# Patient Record
Sex: FEMALE | Race: WHITE | NOT HISPANIC OR LATINO | Employment: FULL TIME | URBAN - METROPOLITAN AREA
[De-identification: names, ages, dates, MRNs, and addresses within clinical notes are randomized per-mention and may not be internally consistent; named-entity substitution may affect disease eponyms.]

---

## 2019-07-25 ENCOUNTER — OFFICE VISIT (OUTPATIENT)
Dept: PRIMARY CARE CLINIC | Facility: CLINIC | Age: 39
End: 2019-07-25
Payer: COMMERCIAL

## 2019-07-25 VITALS
HEIGHT: 64 IN | WEIGHT: 182.81 LBS | TEMPERATURE: 98 F | SYSTOLIC BLOOD PRESSURE: 128 MMHG | DIASTOLIC BLOOD PRESSURE: 76 MMHG | RESPIRATION RATE: 16 BRPM | OXYGEN SATURATION: 98 % | BODY MASS INDEX: 31.21 KG/M2 | HEART RATE: 105 BPM

## 2019-07-25 DIAGNOSIS — S39.92XD INJURY OF COCCYX, SUBSEQUENT ENCOUNTER: ICD-10-CM

## 2019-07-25 DIAGNOSIS — M54.6 CHRONIC BILATERAL THORACIC BACK PAIN: ICD-10-CM

## 2019-07-25 DIAGNOSIS — J45.909 ASTHMA, UNSPECIFIED ASTHMA SEVERITY, UNSPECIFIED WHETHER COMPLICATED, UNSPECIFIED WHETHER PERSISTENT: ICD-10-CM

## 2019-07-25 DIAGNOSIS — E11.9 TYPE 2 DIABETES MELLITUS WITHOUT COMPLICATION, WITHOUT LONG-TERM CURRENT USE OF INSULIN: Primary | ICD-10-CM

## 2019-07-25 DIAGNOSIS — G89.29 CHRONIC BILATERAL THORACIC BACK PAIN: ICD-10-CM

## 2019-07-25 DIAGNOSIS — M05.79 RHEUMATOID ARTHRITIS INVOLVING MULTIPLE SITES WITH POSITIVE RHEUMATOID FACTOR: ICD-10-CM

## 2019-07-25 DIAGNOSIS — F32.A DEPRESSIVE DISORDER: ICD-10-CM

## 2019-07-25 PROBLEM — S39.92XA TAILBONE INJURY: Status: ACTIVE | Noted: 2019-07-25

## 2019-07-25 PROBLEM — M25.50 JOINT PAIN: Status: ACTIVE | Noted: 2019-07-25
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
## 2019-07-25 PROBLEM — F41.1 ANXIETY STATE: Status: ACTIVE | Noted: 2019-07-25

## 2019-07-25 PROCEDURE — 99214 PR OFFICE/OUTPT VISIT, EST, LEVL IV, 30-39 MIN: ICD-10-PCS | Mod: S$GLB,,, | Performed by: FAMILY MEDICINE

## 2019-07-25 PROCEDURE — 3008F BODY MASS INDEX DOCD: CPT | Mod: CPTII,S$GLB,, | Performed by: FAMILY MEDICINE

## 2019-07-25 PROCEDURE — 3008F PR BODY MASS INDEX (BMI) DOCUMENTED: ICD-10-PCS | Mod: CPTII,S$GLB,, | Performed by: FAMILY MEDICINE

## 2019-07-25 PROCEDURE — 99214 OFFICE O/P EST MOD 30 MIN: CPT | Mod: S$GLB,,, | Performed by: FAMILY MEDICINE

## 2019-07-25 RX ORDER — FLUOXETINE HYDROCHLORIDE 40 MG/1
CAPSULE ORAL
COMMUNITY
End: 2022-01-20

## 2019-07-25 RX ORDER — TRAZODONE HYDROCHLORIDE 100 MG/1
200 TABLET ORAL NIGHTLY
Refills: 5 | COMMUNITY
Start: 2019-06-28 | End: 2023-08-30 | Stop reason: SDUPTHER

## 2019-07-25 RX ORDER — PROPRANOLOL HYDROCHLORIDE 20 MG/1
20 TABLET ORAL 3 TIMES DAILY PRN
Refills: 5 | COMMUNITY
Start: 2019-06-29 | End: 2023-08-30

## 2019-07-25 RX ORDER — NALOXONE HYDROCHLORIDE 4 MG/.1ML
SPRAY NASAL
Refills: 0 | COMMUNITY
Start: 2019-05-24

## 2019-07-25 RX ORDER — HYDROCODONE BITARTRATE AND ACETAMINOPHEN 7.5; 325 MG/1; MG/1
1 TABLET ORAL 4 TIMES DAILY
COMMUNITY
End: 2022-01-20

## 2019-07-25 RX ORDER — ALBUTEROL SULFATE 90 UG/1
AEROSOL, METERED RESPIRATORY (INHALATION)
COMMUNITY
End: 2023-01-23 | Stop reason: SDUPTHER

## 2019-07-25 RX ORDER — CETIRIZINE HYDROCHLORIDE 10 MG/1
TABLET ORAL
COMMUNITY

## 2019-07-25 RX ORDER — CLONAZEPAM 1 MG/1
TABLET ORAL
COMMUNITY
End: 2022-01-20

## 2019-07-25 RX ORDER — GABAPENTIN 100 MG/1
CAPSULE ORAL
COMMUNITY
End: 2022-01-20

## 2019-07-25 RX ORDER — METFORMIN HYDROCHLORIDE 1000 MG/1
TABLET ORAL
Qty: 180 TABLET | Refills: 3 | Status: SHIPPED | OUTPATIENT
Start: 2019-07-25 | End: 2020-08-13

## 2019-07-25 RX ORDER — METHOTREXATE 2.5 MG/1
20 TABLET ORAL WEEKLY
Refills: 2 | COMMUNITY
Start: 2019-07-12 | End: 2020-04-03 | Stop reason: SDUPTHER

## 2019-07-25 RX ORDER — ADALIMUMAB 40MG/0.8ML
40 KIT SUBCUTANEOUS
COMMUNITY
End: 2022-01-20

## 2019-07-25 RX ORDER — FOLIC ACID 1 MG/1
TABLET ORAL
COMMUNITY

## 2019-07-25 RX ORDER — METFORMIN HYDROCHLORIDE 1000 MG/1
TABLET ORAL
COMMUNITY
End: 2019-07-25 | Stop reason: SDUPTHER

## 2019-07-25 RX ORDER — CYCLOBENZAPRINE HCL 10 MG
10 TABLET ORAL 3 TIMES DAILY
Refills: 2 | COMMUNITY
Start: 2019-06-29

## 2019-07-25 NOTE — PROGRESS NOTES
"Subjective:       Patient ID: Julianna Hancock is a 39 y.o. female.    Chief Complaint: Follow-up    Patient with chronic RA presents for follow-up on her chronic conditions.  She has history of diabetes which has been well controlled on medications without side effects.  Also with history of depression and anxiety.  This has been up and down.  She states she is doing okay right now, except that her son is leaving for the [place] in a month.  This makes her anxious, obviously.  She denies any side effects from the medications.  Also with slip off her steps and fx'ed tailbone, t 5 and t 6.  No surgery done.  Seeing dr mccloud.  Pain is better, but is persistent.  Her RA worsened after this.  She sees dr edge for this.      Review of Systems   Constitutional: Negative for chills, fatigue, fever and unexpected weight change.   Eyes: Negative for visual disturbance.   Respiratory: Negative for cough, shortness of breath and wheezing.    Cardiovascular: Negative for chest pain and palpitations.   Gastrointestinal: Negative for abdominal pain and blood in stool.   Genitourinary: Negative for difficulty urinating and dysuria.   Musculoskeletal: Positive for arthralgias and back pain.   Skin: Negative for rash.   Neurological: Negative for dizziness, syncope, light-headedness, numbness and headaches.   Hematological: Negative for adenopathy.   Psychiatric/Behavioral: Negative for dysphoric mood. The patient is not nervous/anxious.            Objective:      /76 (BP Location: Right arm, Patient Position: Sitting, BP Method: Large (Manual))   Pulse 105   Temp 97.8 °F (36.6 °C) (Oral)   Resp 16   Ht 5' 4" (1.626 m)   Wt 82.9 kg (182 lb 12.8 oz)   SpO2 98%   BMI 31.38 kg/m²   Estimated body mass index is 31.38 kg/m² as calculated from the following:    Height as of this encounter: 5' 4" (1.626 m).    Weight as of this encounter: 82.9 kg (182 lb 12.8 oz).  Physical Exam   Constitutional: She is oriented to person, " place, and time. She appears well-developed and well-nourished.   HENT:   Head: Normocephalic and atraumatic.   Eyes: Conjunctivae and EOM are normal.   Neck: Neck supple. No thyromegaly present.   Cardiovascular: Normal rate, regular rhythm and intact distal pulses.   No murmur heard.  Pulmonary/Chest: Effort normal and breath sounds normal.   Abdominal: Soft. Bowel sounds are normal. She exhibits no mass. There is no tenderness. There is no rebound and no guarding.   Musculoskeletal: Normal range of motion.   Neurological: She is alert and oriented to person, place, and time.   Skin: Skin is dry. No rash noted.   Psychiatric: She has a normal mood and affect.       Assessment:       Problem List Items Addressed This Visit        Psychiatric    Depressive disorder       Pulmonary    Asthma       Endocrine    Diabetes mellitus - Primary    Relevant Medications    metFORMIN (GLUCOPHAGE) 1000 MG tablet       Orthopedic    Rheumatoid arthritis involving multiple sites with positive rheumatoid factor    Tailbone injury    Chronic bilateral thoracic back pain            Plan:       Type 2 diabetes mellitus without complication, without long-term current use of insulin  -     metFORMIN (GLUCOPHAGE) 1000 MG tablet; Take 1 tablet twice a day by oral route.  Dispense: 180 tablet; Refill: 3    Depressive disorder    Rheumatoid arthritis involving multiple sites with positive rheumatoid factor    Asthma, unspecified asthma severity, unspecified whether complicated, unspecified whether persistent    Injury of coccyx, subsequent encounter    Chronic bilateral thoracic back pain              DISCUSSION:  Labs are good.  Cont meds.  Relaxation tech's discussed.

## 2020-04-03 DIAGNOSIS — M05.79 RHEUMATOID ARTHRITIS INVOLVING MULTIPLE SITES WITH POSITIVE RHEUMATOID FACTOR: Primary | ICD-10-CM

## 2020-04-03 NOTE — TELEPHONE ENCOUNTER
----- Message from Oscar Robins sent at 4/3/2020  8:52 AM CDT -----  Contact: Pt  Julianna called saying she needs a refill of methotrexate 2.5mg tablets 8 tablets weekly because her rheumatologist Dr. Perera is out right now.    [id number] - [place], [place] - [address]  [address] [address]  Phone: [phone] Fax: [phone]

## 2020-04-03 NOTE — TELEPHONE ENCOUNTER
Their office has closed the doors for now due to the COVID Virus but they are still working. She said they informed her to call her PCP. I told her  will be out of the office until Monday. States she will wait for the refill. Her last apt with us was 07/2019. I am sending this to the front so she can get an apt schedule for 06/2020

## 2020-04-06 RX ORDER — METHOTREXATE 2.5 MG/1
20 TABLET ORAL WEEKLY
Qty: 32 TABLET | Refills: 5 | Status: SHIPPED | OUTPATIENT
Start: 2020-04-06 | End: 2020-05-06

## 2020-08-20 ENCOUNTER — PATIENT OUTREACH (OUTPATIENT)
Dept: ADMINISTRATIVE | Facility: HOSPITAL | Age: 40
End: 2020-08-20

## 2020-10-29 ENCOUNTER — OFFICE VISIT (OUTPATIENT)
Dept: PRIMARY CARE CLINIC | Facility: CLINIC | Age: 40
End: 2020-10-29
Payer: COMMERCIAL

## 2020-10-29 VITALS
HEART RATE: 76 BPM | HEIGHT: 64 IN | WEIGHT: 184 LBS | RESPIRATION RATE: 18 BRPM | OXYGEN SATURATION: 98 % | TEMPERATURE: 98 F | DIASTOLIC BLOOD PRESSURE: 74 MMHG | SYSTOLIC BLOOD PRESSURE: 126 MMHG | BODY MASS INDEX: 31.41 KG/M2

## 2020-10-29 DIAGNOSIS — M05.79 RHEUMATOID ARTHRITIS INVOLVING MULTIPLE SITES WITH POSITIVE RHEUMATOID FACTOR: ICD-10-CM

## 2020-10-29 DIAGNOSIS — J45.909 ASTHMA, UNSPECIFIED ASTHMA SEVERITY, UNSPECIFIED WHETHER COMPLICATED, UNSPECIFIED WHETHER PERSISTENT: ICD-10-CM

## 2020-10-29 DIAGNOSIS — F32.A DEPRESSIVE DISORDER: ICD-10-CM

## 2020-10-29 DIAGNOSIS — Z00.01 ANNUAL VISIT FOR GENERAL ADULT MEDICAL EXAMINATION WITH ABNORMAL FINDINGS: Primary | ICD-10-CM

## 2020-10-29 DIAGNOSIS — E11.9 TYPE 2 DIABETES MELLITUS WITHOUT COMPLICATION, WITHOUT LONG-TERM CURRENT USE OF INSULIN: ICD-10-CM

## 2020-10-29 PROCEDURE — 99396 PR PREVENTIVE VISIT,EST,40-64: ICD-10-PCS | Mod: S$GLB,,, | Performed by: FAMILY MEDICINE

## 2020-10-29 PROCEDURE — 3008F BODY MASS INDEX DOCD: CPT | Mod: CPTII,S$GLB,, | Performed by: FAMILY MEDICINE

## 2020-10-29 PROCEDURE — 3008F PR BODY MASS INDEX (BMI) DOCUMENTED: ICD-10-PCS | Mod: CPTII,S$GLB,, | Performed by: FAMILY MEDICINE

## 2020-10-29 PROCEDURE — 99396 PREV VISIT EST AGE 40-64: CPT | Mod: S$GLB,,, | Performed by: FAMILY MEDICINE

## 2020-10-29 RX ORDER — METFORMIN HYDROCHLORIDE 1000 MG/1
TABLET ORAL
Qty: 60 TABLET | Refills: 11 | Status: SHIPPED | OUTPATIENT
Start: 2020-10-29 | End: 2022-01-20 | Stop reason: SDUPTHER
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
## 2021-03-11 ENCOUNTER — IMMUNIZATION (OUTPATIENT)
Dept: HEMATOLOGY/ONCOLOGY | Facility: CLINIC | Age: 41
End: 2021-03-11
Payer: COMMERCIAL

## 2021-03-11 DIAGNOSIS — Z23 NEED FOR VACCINATION: Primary | ICD-10-CM

## 2021-03-11 PROCEDURE — 91301 COVID-19, MRNA, LNP-S, PF, 100 MCG/0.5 ML DOSE VACCINE: ICD-10-PCS | Mod: S$GLB,,, | Performed by: FAMILY MEDICINE

## 2021-03-11 PROCEDURE — 91301 COVID-19, MRNA, LNP-S, PF, 100 MCG/0.5 ML DOSE VACCINE: CPT | Mod: S$GLB,,, | Performed by: FAMILY MEDICINE

## 2021-03-11 PROCEDURE — 0011A COVID-19, MRNA, LNP-S, PF, 100 MCG/0.5 ML DOSE VACCINE: CPT | Mod: CV19,S$GLB,, | Performed by: FAMILY MEDICINE

## 2021-03-11 PROCEDURE — 0011A COVID-19, MRNA, LNP-S, PF, 100 MCG/0.5 ML DOSE VACCINE: ICD-10-PCS | Mod: CV19,S$GLB,, | Performed by: FAMILY MEDICINE

## 2021-04-08 ENCOUNTER — IMMUNIZATION (OUTPATIENT)
Dept: HEMATOLOGY/ONCOLOGY | Facility: CLINIC | Age: 41
End: 2021-04-08
Payer: COMMERCIAL

## 2021-04-08 DIAGNOSIS — Z23 NEED FOR VACCINATION: Primary | ICD-10-CM

## 2021-04-08 PROCEDURE — 0012A COVID-19, MRNA, LNP-S, PF, 100 MCG/0.5 ML DOSE VACCINE: ICD-10-PCS | Mod: CV19,S$GLB,, | Performed by: FAMILY MEDICINE

## 2021-04-08 PROCEDURE — 91301 COVID-19, MRNA, LNP-S, PF, 100 MCG/0.5 ML DOSE VACCINE: CPT | Mod: S$GLB,,, | Performed by: FAMILY MEDICINE

## 2021-04-08 PROCEDURE — 91301 COVID-19, MRNA, LNP-S, PF, 100 MCG/0.5 ML DOSE VACCINE: ICD-10-PCS | Mod: S$GLB,,, | Performed by: FAMILY MEDICINE

## 2021-04-08 PROCEDURE — 0012A COVID-19, MRNA, LNP-S, PF, 100 MCG/0.5 ML DOSE VACCINE: CPT | Mod: CV19,S$GLB,, | Performed by: FAMILY MEDICINE

## 2021-04-26 ENCOUNTER — PATIENT OUTREACH (OUTPATIENT)
Dept: ADMINISTRATIVE | Facility: HOSPITAL | Age: 41
End: 2021-04-26

## 2022-01-06 ENCOUNTER — IMMUNIZATION (OUTPATIENT)
Dept: HEMATOLOGY/ONCOLOGY | Facility: CLINIC | Age: 42
End: 2022-01-06
Payer: COMMERCIAL

## 2022-01-06 DIAGNOSIS — Z23 NEED FOR VACCINATION: Primary | ICD-10-CM

## 2022-01-06 PROCEDURE — 0064A COVID-19, MRNA, LNP-S, PF, 100 MCG/0.25 ML DOSE VACCINE (MODERNA BOOSTER): ICD-10-PCS | Mod: S$GLB,,, | Performed by: FAMILY MEDICINE

## 2022-01-06 PROCEDURE — 91306 COVID-19, MRNA, LNP-S, PF, 100 MCG/0.25 ML DOSE VACCINE (MODERNA BOOSTER): ICD-10-PCS | Mod: S$GLB,,, | Performed by: FAMILY MEDICINE

## 2022-01-06 PROCEDURE — 0064A COVID-19, MRNA, LNP-S, PF, 100 MCG/0.25 ML DOSE VACCINE (MODERNA BOOSTER): CPT | Mod: S$GLB,,, | Performed by: FAMILY MEDICINE

## 2022-01-06 PROCEDURE — 91306 COVID-19, MRNA, LNP-S, PF, 100 MCG/0.25 ML DOSE VACCINE (MODERNA BOOSTER): CPT | Mod: S$GLB,,, | Performed by: FAMILY MEDICINE

## 2022-01-20 ENCOUNTER — PATIENT MESSAGE (OUTPATIENT)
Dept: ADMINISTRATIVE | Facility: HOSPITAL | Age: 42
End: 2022-01-20
Payer: COMMERCIAL

## 2022-01-20 ENCOUNTER — OFFICE VISIT (OUTPATIENT)
Dept: PRIMARY CARE CLINIC | Facility: CLINIC | Age: 42
End: 2022-01-20
Payer: COMMERCIAL

## 2022-01-20 ENCOUNTER — CLINICAL SUPPORT (OUTPATIENT)
Dept: HEMATOLOGY/ONCOLOGY | Facility: CLINIC | Age: 42
End: 2022-01-20
Payer: COMMERCIAL

## 2022-01-20 VITALS
HEIGHT: 64 IN | DIASTOLIC BLOOD PRESSURE: 88 MMHG | BODY MASS INDEX: 30.11 KG/M2 | OXYGEN SATURATION: 100 % | SYSTOLIC BLOOD PRESSURE: 128 MMHG | WEIGHT: 176.38 LBS | HEART RATE: 87 BPM | TEMPERATURE: 98 F

## 2022-01-20 DIAGNOSIS — E11.9 TYPE 2 DIABETES MELLITUS WITHOUT COMPLICATION, WITHOUT LONG-TERM CURRENT USE OF INSULIN: ICD-10-CM

## 2022-01-20 DIAGNOSIS — J45.909 ASTHMA, UNSPECIFIED ASTHMA SEVERITY, UNSPECIFIED WHETHER COMPLICATED, UNSPECIFIED WHETHER PERSISTENT: Primary | ICD-10-CM

## 2022-01-20 DIAGNOSIS — R09.81 NASAL CONGESTION: Primary | ICD-10-CM

## 2022-01-20 DIAGNOSIS — M05.79 RHEUMATOID ARTHRITIS INVOLVING MULTIPLE SITES WITH POSITIVE RHEUMATOID FACTOR: ICD-10-CM

## 2022-01-20 DIAGNOSIS — U07.1 COVID-19 VIRUS DETECTED: ICD-10-CM

## 2022-01-20 DIAGNOSIS — Z00.01 ANNUAL VISIT FOR GENERAL ADULT MEDICAL EXAMINATION WITH ABNORMAL FINDINGS: ICD-10-CM

## 2022-01-20 DIAGNOSIS — F32.A DEPRESSIVE DISORDER: ICD-10-CM

## 2022-01-20 LAB
CTP QC/QA: YES
SARS-COV-2 AG RESP QL IA.RAPID: POSITIVE

## 2022-01-20 PROCEDURE — 3008F PR BODY MASS INDEX (BMI) DOCUMENTED: ICD-10-PCS | Mod: CPTII,S$GLB,, | Performed by: FAMILY MEDICINE

## 2022-01-20 PROCEDURE — 3079F DIAST BP 80-89 MM HG: CPT | Mod: CPTII,S$GLB,, | Performed by: FAMILY MEDICINE

## 2022-01-20 PROCEDURE — 1160F PR REVIEW ALL MEDS BY PRESCRIBER/CLIN PHARMACIST DOCUMENTED: ICD-10-PCS | Mod: CPTII,S$GLB,, | Performed by: FAMILY MEDICINE

## 2022-01-20 PROCEDURE — 1160F RVW MEDS BY RX/DR IN RCRD: CPT | Mod: CPTII,S$GLB,, | Performed by: FAMILY MEDICINE

## 2022-01-20 PROCEDURE — 3079F PR MOST RECENT DIASTOLIC BLOOD PRESSURE 80-89 MM HG: ICD-10-PCS | Mod: CPTII,S$GLB,, | Performed by: FAMILY MEDICINE

## 2022-01-20 PROCEDURE — 1159F PR MEDICATION LIST DOCUMENTED IN MEDICAL RECORD: ICD-10-PCS | Mod: CPTII,S$GLB,, | Performed by: FAMILY MEDICINE

## 2022-01-20 PROCEDURE — 3074F PR MOST RECENT SYSTOLIC BLOOD PRESSURE < 130 MM HG: ICD-10-PCS | Mod: CPTII,S$GLB,, | Performed by: FAMILY MEDICINE

## 2022-01-20 PROCEDURE — 87811 SARS-COV-2 COVID19 W/OPTIC: CPT | Mod: S$GLB,,, | Performed by: INTERNAL MEDICINE

## 2022-01-20 PROCEDURE — 1159F MED LIST DOCD IN RCRD: CPT | Mod: CPTII,S$GLB,, | Performed by: FAMILY MEDICINE

## 2022-01-20 PROCEDURE — 3008F BODY MASS INDEX DOCD: CPT | Mod: CPTII,S$GLB,, | Performed by: FAMILY MEDICINE

## 2022-01-20 PROCEDURE — 99396 PREV VISIT EST AGE 40-64: CPT | Mod: S$GLB,,, | Performed by: FAMILY MEDICINE

## 2022-01-20 PROCEDURE — 3074F SYST BP LT 130 MM HG: CPT | Mod: CPTII,S$GLB,, | Performed by: FAMILY MEDICINE

## 2022-01-20 PROCEDURE — 99396 PR PREVENTIVE VISIT,EST,40-64: ICD-10-PCS | Mod: S$GLB,,, | Performed by: FAMILY MEDICINE

## 2022-01-20 PROCEDURE — 87811 SARS CORONAVIRUS 2 ANTIGEN POCT, MANUAL READ: ICD-10-PCS | Mod: S$GLB,,, | Performed by: INTERNAL MEDICINE

## 2022-01-20 RX ORDER — PSEUDOEPHEDRINE HCL 120 MG/1
TABLET, FILM COATED, EXTENDED RELEASE ORAL
COMMUNITY
Start: 2022-01-15 | End: 2023-08-30

## 2022-01-20 RX ORDER — CALCIUM CARBONATE 600 MG
600 TABLET ORAL ONCE
COMMUNITY

## 2022-01-20 RX ORDER — ACETAMINOPHEN 500 MG
1 TABLET ORAL DAILY
COMMUNITY

## 2022-01-20 RX ORDER — METFORMIN HYDROCHLORIDE 1000 MG/1
TABLET ORAL
Qty: 60 TABLET | Refills: 11 | Status: SHIPPED | OUTPATIENT
Start: 2022-01-20 | End: 2023-01-30

## 2022-01-20 RX ORDER — METHOTREXATE 2.5 MG/1
TABLET ORAL
COMMUNITY

## 2022-01-20 NOTE — PROGRESS NOTES
Subjective:       Patient ID: Julianna Hancock is a 41 y.o. female.    Chief Complaint: Follow-up    Patient presents for her annual wellness visit.  She has history of asthma.  This has been well controlled on medications without side effects.  She is only on albuterol as needed.  She really has to use it.  Also with history of diabetes.  She is on metformin 1000 mg b.i.d..  She denies any side effects and states it is worked well.  It is time to recheck this.  Also with history of depression.  She takes multiple medications for this and states it has been fairly well controlled.  She has been having much pain with the weather changes.  She has history of rheumatoid arthritis and sees Rheumatology monthly for this.  She is on the Orencia.        Review of Systems   Constitutional: Negative for chills, fatigue, fever and unexpected weight change.   Eyes: Negative for visual disturbance.   Respiratory: Negative for cough, shortness of breath and wheezing.    Cardiovascular: Negative for chest pain and palpitations.   Gastrointestinal: Negative for abdominal pain and blood in stool.   Genitourinary: Negative for difficulty urinating and dysuria.   Musculoskeletal: Positive for arthralgias and myalgias. Negative for back pain.   Skin: Negative for rash.   Neurological: Negative for dizziness, syncope, light-headedness, numbness and headaches.   Hematological: Negative for adenopathy.   Psychiatric/Behavioral: Negative for dysphoric mood. The patient is not nervous/anxious.      Medication List with Changes/Refills   Current Medications    ABATACEPT/MALTOSE (ORENCIA, WITH MALTOSE, IV)        ADALIMUMAB (HUMIRA) 40 MG/0.8 ML SYKT    Inject 40 mg into the skin every 14 (fourteen) days.    ALBUTEROL (PROVENTIL/VENTOLIN HFA) 90 MCG/ACTUATION INHALER    ProAir HFA 90 mcg/actuation aerosol inhaler   Inhale 2 puffs every 4 hours by inhalation route.    ASPIRIN-ACETAMINOPHEN-CAFFEINE 250-250-65 MG (EXCEDRIN MIGRAINE) 250-250-65 MG  "PER TABLET    Excedrin Migraine   2 tablets bid    CALCIUM CARBONATE (OS-ASHLEE) 600 MG CALCIUM (1,500 MG) TAB    Take 600 mg by mouth once.    CETIRIZINE (ZYRTEC) 10 MG TABLET    Zyrtec 10 mg tablet   Take 1 tablet every day by oral route.    CHOLECALCIFEROL, VITAMIN D3, (VITAMIN D3) 50 MCG (2,000 UNIT) CAP    Take 1 capsule by mouth once daily.    CLONAZEPAM (KLONOPIN) 1 MG TABLET    clonazepam 1 mg tablet    CYCLOBENZAPRINE (FLEXERIL) 10 MG TABLET    Take 10 mg by mouth 3 (three) times daily.    FLUOXETINE 40 MG CAPSULE    fluoxetine 40 mg capsule    FOLIC ACID (FOLVITE) 1 MG TABLET    folic acid 1 mg tablet    GABAPENTIN (NEURONTIN) 100 MG CAPSULE    gabapentin 100 mg capsule    HYDROCODONE-ACETAMINOPHEN (NORCO) 7.5-325 MG PER TABLET    Take 1 tablet by mouth 4 (four) times daily.    METHOTREXATE 2.5 MG TAB    Take 8 tablets (20 mg total) by mouth once a week.    METHOTREXATE 2.5 MG TAB    methotrexate sodium 2.5 mg tablet    NARCAN 4 MG/ACTUATION SPRY    USE WHEN NECESSARY FOR OVERDOSE. MAY REPEAT EVERY 3 MINUTES. AFTER FIRST DOSE CALL 911.    PROPRANOLOL (INDERAL) 20 MG TABLET    Take 20 mg by mouth 3 (three) times daily as needed.    PSEUDOEPHEDRINE (SUDAFED) 120 MG 12 HR TABLET        TRAZODONE (DESYREL) 100 MG TABLET    Take 200 mg by mouth nightly.   Changed and/or Refilled Medications    Modified Medication Previous Medication    METFORMIN (GLUCOPHAGE) 1000 MG TABLET metFORMIN (GLUCOPHAGE) 1000 MG tablet       Take 1 tablet by mouth twice daily    Take 1 tablet by mouth twice daily         Objective:      /88   Pulse 87   Temp 98.2 °F (36.8 °C)   Ht 5' 4" (1.626 m)   Wt 80 kg (176 lb 6.4 oz)   SpO2 100%   BMI 30.28 kg/m²   Estimated body mass index is 30.28 kg/m² as calculated from the following:    Height as of this encounter: 5' 4" (1.626 m).    Weight as of this encounter: 80 kg (176 lb 6.4 oz).  Physical Exam  Vitals reviewed.   Constitutional:       General: She is not in acute distress.     " Appearance: Normal appearance. She is well-developed and well-nourished. She is obese. She is not ill-appearing.   HENT:      Head: Normocephalic and atraumatic.   Eyes:      Extraocular Movements: EOM normal.      Conjunctiva/sclera: Conjunctivae normal.   Neck:      Thyroid: No thyromegaly.   Cardiovascular:      Rate and Rhythm: Normal rate and regular rhythm.      Pulses: Intact distal pulses.      Heart sounds: No murmur heard.      Pulmonary:      Effort: Pulmonary effort is normal.      Breath sounds: Normal breath sounds.   Abdominal:      General: Bowel sounds are normal.      Palpations: Abdomen is soft. There is no mass.      Tenderness: There is no abdominal tenderness. There is no guarding or rebound.   Musculoskeletal:         General: Normal range of motion.      Cervical back: Neck supple.   Skin:     General: Skin is dry.      Findings: No rash.   Neurological:      General: No focal deficit present.      Mental Status: She is alert and oriented to person, place, and time.   Psychiatric:         Mood and Affect: Mood and affect and mood normal.         Behavior: Behavior normal.         Thought Content: Thought content normal.         Judgment: Judgment normal.         Assessment:       Problem List Items Addressed This Visit        Psychiatric    Depressive disorder       Pulmonary    Asthma - Primary       Endocrine    Diabetes mellitus    Relevant Medications    metFORMIN (GLUCOPHAGE) 1000 MG tablet    Other Relevant Orders    Hemoglobin A1C    Lipid Panel       Orthopedic    Rheumatoid arthritis involving multiple sites with positive rheumatoid factor            Plan:       Asthma, unspecified asthma severity, unspecified whether complicated, unspecified whether persistent    Type 2 diabetes mellitus without complication, without long-term current use of insulin  -     metFORMIN (GLUCOPHAGE) 1000 MG tablet; Take 1 tablet by mouth twice daily  Dispense: 60 tablet; Refill: 11  -     Hemoglobin  A1C; Future; Expected date: 01/20/2022  -     Lipid Panel; Future; Expected date: 01/20/2022    Depressive disorder    Rheumatoid arthritis involving multiple sites with positive rheumatoid factor              DISCUSSION:  Get labs from [facility] and add lipids and a1c and tsh.  Cont f/u with rheum.  And exercise.

## 2022-04-21 ENCOUNTER — PATIENT MESSAGE (OUTPATIENT)
Dept: ADMINISTRATIVE | Facility: HOSPITAL | Age: 42
End: 2022-04-21
Payer: COMMERCIAL

## 2022-04-22 DIAGNOSIS — E11.9 TYPE 2 DIABETES MELLITUS WITHOUT COMPLICATION, UNSPECIFIED WHETHER LONG TERM INSULIN USE: ICD-10-CM

## 2022-07-18 ENCOUNTER — PATIENT OUTREACH (OUTPATIENT)
Dept: ADMINISTRATIVE | Facility: HOSPITAL | Age: 42
End: 2022-07-18
Payer: COMMERCIAL

## 2022-07-18 NOTE — PROGRESS NOTES
Working A1C gap report. Pt had not been compliant with completing her labs. Home A1C meter ordered back in April however no results found at this time. TRC

## 2022-09-02 ENCOUNTER — PATIENT OUTREACH (OUTPATIENT)
Dept: ADMINISTRATIVE | Facility: HOSPITAL | Age: 42
End: 2022-09-02
Payer: COMMERCIAL

## 2023-01-23 ENCOUNTER — OFFICE VISIT (OUTPATIENT)
Dept: FAMILY MEDICINE | Facility: CLINIC | Age: 43
End: 2023-01-23
Payer: COMMERCIAL

## 2023-01-23 VITALS
HEART RATE: 89 BPM | BODY MASS INDEX: 27.66 KG/M2 | SYSTOLIC BLOOD PRESSURE: 124 MMHG | OXYGEN SATURATION: 97 % | HEIGHT: 64 IN | DIASTOLIC BLOOD PRESSURE: 89 MMHG | WEIGHT: 162 LBS | RESPIRATION RATE: 18 BRPM | TEMPERATURE: 98 F

## 2023-01-23 DIAGNOSIS — E11.65 TYPE 2 DIABETES MELLITUS WITH HYPERGLYCEMIA, WITHOUT LONG-TERM CURRENT USE OF INSULIN: Primary | Chronic | ICD-10-CM

## 2023-01-23 DIAGNOSIS — M05.79 RHEUMATOID ARTHRITIS INVOLVING MULTIPLE SITES WITH POSITIVE RHEUMATOID FACTOR: Chronic | ICD-10-CM

## 2023-01-23 DIAGNOSIS — Z11.59 ENCOUNTER FOR SCREENING FOR OTHER VIRAL DISEASES: ICD-10-CM

## 2023-01-23 DIAGNOSIS — J30.2 SEASONAL ALLERGIES: Chronic | ICD-10-CM

## 2023-01-23 DIAGNOSIS — Z12.31 BREAST CANCER SCREENING BY MAMMOGRAM: ICD-10-CM

## 2023-01-23 DIAGNOSIS — J45.909 MODERATE ASTHMA WITHOUT COMPLICATION, UNSPECIFIED WHETHER PERSISTENT: Chronic | ICD-10-CM

## 2023-01-23 DIAGNOSIS — M15.8 OTHER OSTEOARTHRITIS INVOLVING MULTIPLE JOINTS: Chronic | ICD-10-CM

## 2023-01-23 PROBLEM — F43.10 PTSD (POST-TRAUMATIC STRESS DISORDER): Status: ACTIVE | Noted: 2023-01-23

## 2023-01-23 PROBLEM — M15.9 DEGENERATIVE JOINT DISEASE INVOLVING MULTIPLE JOINTS: Chronic | Status: ACTIVE | Noted: 2023-01-23

## 2023-01-23 PROCEDURE — 3079F PR MOST RECENT DIASTOLIC BLOOD PRESSURE 80-89 MM HG: ICD-10-PCS | Mod: CPTII,S$GLB,, | Performed by: NURSE PRACTITIONER

## 2023-01-23 PROCEDURE — 3074F SYST BP LT 130 MM HG: CPT | Mod: CPTII,S$GLB,, | Performed by: NURSE PRACTITIONER

## 2023-01-23 PROCEDURE — 99204 PR OFFICE/OUTPT VISIT, NEW, LEVL IV, 45-59 MIN: ICD-10-PCS | Mod: S$GLB,,, | Performed by: NURSE PRACTITIONER

## 2023-01-23 PROCEDURE — 99204 OFFICE O/P NEW MOD 45 MIN: CPT | Mod: S$GLB,,, | Performed by: NURSE PRACTITIONER

## 2023-01-23 PROCEDURE — 3008F BODY MASS INDEX DOCD: CPT | Mod: CPTII,S$GLB,, | Performed by: NURSE PRACTITIONER

## 2023-01-23 PROCEDURE — 3074F PR MOST RECENT SYSTOLIC BLOOD PRESSURE < 130 MM HG: ICD-10-PCS | Mod: CPTII,S$GLB,, | Performed by: NURSE PRACTITIONER

## 2023-01-23 PROCEDURE — 3008F PR BODY MASS INDEX (BMI) DOCUMENTED: ICD-10-PCS | Mod: CPTII,S$GLB,, | Performed by: NURSE PRACTITIONER

## 2023-01-23 PROCEDURE — 1159F PR MEDICATION LIST DOCUMENTED IN MEDICAL RECORD: ICD-10-PCS | Mod: CPTII,S$GLB,, | Performed by: NURSE PRACTITIONER

## 2023-01-23 PROCEDURE — 1159F MED LIST DOCD IN RCRD: CPT | Mod: CPTII,S$GLB,, | Performed by: NURSE PRACTITIONER

## 2023-01-23 PROCEDURE — 1160F PR REVIEW ALL MEDS BY PRESCRIBER/CLIN PHARMACIST DOCUMENTED: ICD-10-PCS | Mod: CPTII,S$GLB,, | Performed by: NURSE PRACTITIONER

## 2023-01-23 PROCEDURE — 1160F RVW MEDS BY RX/DR IN RCRD: CPT | Mod: CPTII,S$GLB,, | Performed by: NURSE PRACTITIONER

## 2023-01-23 PROCEDURE — 3079F DIAST BP 80-89 MM HG: CPT | Mod: CPTII,S$GLB,, | Performed by: NURSE PRACTITIONER

## 2023-01-23 RX ORDER — HYDROCODONE BITARTRATE AND ACETAMINOPHEN 7.5; 325 MG/1; MG/1
TABLET ORAL
COMMUNITY
Start: 2022-12-28

## 2023-01-23 RX ORDER — ALBUTEROL SULFATE 90 UG/1
2 AEROSOL, METERED RESPIRATORY (INHALATION) EVERY 4 HOURS PRN
Qty: 18 G | Refills: 3 | Status: SHIPPED | OUTPATIENT
Start: 2023-01-23
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
## 2023-01-27 LAB
CHOLEST SERPL-MSCNC: 121 MG/DL
HBA1C MFR BLD: 6.1 % (ref 4.5–6.2)
HCV AB SERPL QL IA: NONREACTIVE
HDL/CHOLESTEROL RATIO: 3.2 RATIO
HDLC SERPL-MCNC: 38 MG/DL (ref 39–96)
LDLC SERPL CALC-MCNC: 65.2 MG/DL
T4 FREE SP9 P CHAL SERPL-SCNC: 1.12 NG/DL (ref 0.76–1.46)
TRIGL SERPL-MCNC: 89 MG/DL (ref 30–200)
TSH SERPL DL<=0.005 MIU/L-ACNC: 1.57 UIU/ML (ref 0.36–3.74)
VLDL CHOLESTEROL: 18 MG/DL (ref 0–40)

## 2023-01-28 LAB — HIV 1+2 AB+HIV1 P24 AG SERPL QL IA: NEGATIVE

## 2023-01-30 ENCOUNTER — PATIENT MESSAGE (OUTPATIENT)
Dept: FAMILY MEDICINE | Facility: CLINIC | Age: 43
End: 2023-01-30
Payer: COMMERCIAL

## 2023-01-30 DIAGNOSIS — E11.65 TYPE 2 DIABETES MELLITUS WITH HYPERGLYCEMIA, WITHOUT LONG-TERM CURRENT USE OF INSULIN: Primary | ICD-10-CM

## 2023-01-30 RX ORDER — ORAL SEMAGLUTIDE 3 MG/1
3 TABLET ORAL EVERY MORNING
Qty: 30 TABLET | Refills: 11 | Status: SHIPPED | OUTPATIENT
Start: 2023-01-30

## 2023-01-30 RX ORDER — METFORMIN HYDROCHLORIDE 500 MG/1
500 TABLET ORAL 2 TIMES DAILY WITH MEALS
Qty: 180 TABLET | Refills: 3 | Status: SHIPPED | OUTPATIENT
Start: 2023-01-30 | End: 2024-02-07

## 2023-02-03 ENCOUNTER — OFFICE VISIT (OUTPATIENT)
Dept: OBSTETRICS AND GYNECOLOGY | Facility: CLINIC | Age: 43
End: 2023-02-03
Payer: COMMERCIAL

## 2023-02-03 VITALS
SYSTOLIC BLOOD PRESSURE: 145 MMHG | BODY MASS INDEX: 27.98 KG/M2 | DIASTOLIC BLOOD PRESSURE: 93 MMHG | WEIGHT: 163 LBS | HEART RATE: 98 BPM

## 2023-02-03 DIAGNOSIS — Z01.419 ROUTINE GYNECOLOGICAL EXAMINATION: Primary | ICD-10-CM

## 2023-02-03 DIAGNOSIS — Z12.31 SCREENING MAMMOGRAM, ENCOUNTER FOR: ICD-10-CM

## 2023-02-03 PROCEDURE — 3008F PR BODY MASS INDEX (BMI) DOCUMENTED: ICD-10-PCS | Mod: CPTII,S$GLB,, | Performed by: OBSTETRICS & GYNECOLOGY

## 2023-02-03 PROCEDURE — 99386 PR PREVENTIVE VISIT,NEW,40-64: ICD-10-PCS | Mod: S$GLB,,, | Performed by: OBSTETRICS & GYNECOLOGY

## 2023-02-03 PROCEDURE — 3080F PR MOST RECENT DIASTOLIC BLOOD PRESSURE >= 90 MM HG: ICD-10-PCS | Mod: CPTII,S$GLB,, | Performed by: OBSTETRICS & GYNECOLOGY

## 2023-02-03 PROCEDURE — 3077F PR MOST RECENT SYSTOLIC BLOOD PRESSURE >= 140 MM HG: ICD-10-PCS | Mod: CPTII,S$GLB,, | Performed by: OBSTETRICS & GYNECOLOGY

## 2023-02-03 PROCEDURE — 3044F PR MOST RECENT HEMOGLOBIN A1C LEVEL <7.0%: ICD-10-PCS | Mod: CPTII,S$GLB,, | Performed by: OBSTETRICS & GYNECOLOGY

## 2023-02-03 PROCEDURE — 99386 PREV VISIT NEW AGE 40-64: CPT | Mod: S$GLB,,, | Performed by: OBSTETRICS & GYNECOLOGY

## 2023-02-03 PROCEDURE — 3044F HG A1C LEVEL LT 7.0%: CPT | Mod: CPTII,S$GLB,, | Performed by: OBSTETRICS & GYNECOLOGY

## 2023-02-03 PROCEDURE — 3008F BODY MASS INDEX DOCD: CPT | Mod: CPTII,S$GLB,, | Performed by: OBSTETRICS & GYNECOLOGY

## 2023-02-03 PROCEDURE — 3080F DIAST BP >= 90 MM HG: CPT | Mod: CPTII,S$GLB,, | Performed by: OBSTETRICS & GYNECOLOGY

## 2023-02-03 PROCEDURE — 3077F SYST BP >= 140 MM HG: CPT | Mod: CPTII,S$GLB,, | Performed by: OBSTETRICS & GYNECOLOGY

## 2023-02-03 NOTE — PROGRESS NOTES
Subjective:       Patient ID: Julianna Hancock is a 42 y.o. female.    Chief Complaint:  Well Woman      History of Present Illness  Gynecologic Exam  The patient's pertinent negatives include no genital itching, genital lesions, genital odor, genital rash, missed menses, pelvic pain, vaginal bleeding or vaginal discharge. She is not pregnant. Associated symptoms include back pain, headaches and nausea. Pertinent negatives include no abdominal pain, anorexia, chills, constipation, diarrhea, discolored urine, dysuria, fever, flank pain, frequency, hematuria, painful intercourse, rash, urgency or vomiting. There has been no bleeding. She has not been passing clots. She has not been passing tissue. The symptoms are aggravated by activity, eating and heavy lifting. She has tried acetaminophen, NSAIDs and oral narcotics for the symptoms. The treatment provided no relief. She is sexually active. No, her partner does not have an STD. She uses nothing for contraception. Her menstrual history has been irregular. Her past medical history is significant for menorrhagia. There is no history of an abdominal surgery, a  section, an ectopic pregnancy, endometriosis, a gynecological surgery, herpes simplex, metrorrhagia, miscarriage, ovarian cysts, perineal abscess, PID, an STD, a terminated pregnancy or vaginosis.   Has noted a lump in left axillae. Has been there since the summer. Has had a mammogram ordered.  H/o RA and has it in shoulders. Has had it for 16 years. S/p uterine ablation with Dr. Marion.    GYN & OB History  No LMP recorded.   Date of Last Pap: 2018    OB History   No obstetric history on file.       Review of Systems  Review of Systems   Constitutional:  Negative for chills and fever.   Gastrointestinal:  Positive for nausea. Negative for abdominal pain, anorexia, constipation, diarrhea and vomiting.   Genitourinary:  Positive for menorrhagia. Negative for dysuria, flank pain, frequency, hematuria,  missed menses, pelvic pain, urgency and vaginal discharge.   Musculoskeletal:  Positive for back pain.   Integumentary:  Negative for rash.   Neurological:  Positive for headaches.         Objective:    Physical Exam:   Constitutional: She is oriented to person, place, and time. Vital signs are normal. She appears well-developed and well-nourished. She is cooperative.      Neck: No thyroid mass and no thyromegaly present.    Cardiovascular:  Normal rate, regular rhythm and normal pulses.             Pulmonary/Chest: Effort normal. No respiratory distress. Chest wall is not dull to percussion. She exhibits no mass, no bony tenderness, no laceration, no crepitus, no edema, no deformity, no swelling and no retraction. Right breast exhibits no inverted nipple, no mass, no nipple discharge, no skin change, no tenderness, presence, no bleeding and no swelling. Left breast exhibits no inverted nipple, no mass, no nipple discharge, no skin change, no tenderness, presence, no bleeding and no swelling.   Small lymph node in left breast.            Abdominal: Soft. She exhibits no distension. There is no abdominal tenderness. No hernia.     Genitourinary:    Vagina, uterus and rectum normal.      Pelvic exam was performed with patient supine.   Labial bartholins normal.There is no rash, tenderness, lesion or injury on the right labia. There is no rash, tenderness, lesion or injury on the left labia. Cervix is normal. Right adnexum displays no mass, no tenderness and no fullness. Left adnexum displays no mass, no tenderness and no fullness. No  no vaginal discharge, rectocele, cystocele or unspecified prolapse of vaginal walls in the vagina.           Musculoskeletal: Moves all extremeties.       Neurological: She is alert and oriented to person, place, and time.    Skin: Skin is warm and dry. No rash noted.    Psychiatric: She has a normal mood and affect. Her speech is normal and behavior is normal. Judgment and thought  content normal.        Assessment:        1. Routine gynecological examination    2. Screening mammogram, encounter for                Plan:      Patient is seeing Dr. Perera for RA.   Will f/u mammogram.

## 2023-02-03 NOTE — PROGRESS NOTES
Answers submitted by the patient for this visit:  Gynecologic Exam Questionnaire  (Submitted on 2023)  Chief Complaint: Gynecologic exam  genital itching: No  genital lesions: No  genital odor: No  genital rash: No  missed menses: No  pelvic pain: No  vaginal bleeding: No  vaginal discharge: No  Pregnant now?: No  abdominal pain: No  anorexia: No  back pain: Yes  chills: No  constipation: No  diarrhea: No  discolored urine: No  dysuria: No  fever: No  flank pain: No  frequency: No  headaches: Yes  hematuria: No  nausea: Yes  painful intercourse: No  rash: No  urgency: No  vomiting: No  Vaginal bleeding: no bleeding  Passing clots?: No  Passing tissue?: No  Aggravated by: activity, eating, heavy lifting  treatments tried: acetaminophen, NSAIDs, oral narcotics  Improvement on treatment: no relief  Sexual activity: sexually active  Partner with STD symptoms: no  Birth control: nothing  Menstrual history: irregular  STD: No  abdominal surgery: No   section: No  Ectopic pregnancy: No  Endometriosis: No  herpes simplex: No  gynecological surgery: No  menorrhagia: Yes  metrorrhagia: No  miscarriage: No  ovarian cysts: No  perineal abscess: No  PID: No  terminated pregnancy: No  vaginosis: No

## 2023-02-13 LAB — Lab: NORMAL

## 2023-03-13 ENCOUNTER — TELEPHONE (OUTPATIENT)
Dept: FAMILY MEDICINE | Facility: CLINIC | Age: 43
End: 2023-03-13
Payer: COMMERCIAL

## 2023-03-13 NOTE — TELEPHONE ENCOUNTER
----- Message from Gladys Jeong LPN sent at 3/13/2023  3:21 PM CDT -----  Contact: Celestino([facility])    ----- Message -----  From: Antonina Jeter  Sent: 3/13/2023   3:20 PM CDT  To: [facility] Staff    Celestino called to consult with nurse or staff regarding the patient. She states she sent a fax to the office and wanted to know if this had been received. Celestino would like a call back and can be reached at [phone]. Thanks/

## 2023-03-14 ENCOUNTER — TELEPHONE (OUTPATIENT)
Dept: FAMILY MEDICINE | Facility: CLINIC | Age: 43
End: 2023-03-14
Payer: COMMERCIAL

## 2023-03-14 NOTE — TELEPHONE ENCOUNTER
----- Message from Lillie Jama sent at 3/14/2023 10:15 AM CDT -----  Contact: Celestino teran/[facility] program calling to fine out fax rcvd and they can be reached at [phone]. Fax number [phone].    Thanks,

## 2023-05-15 ENCOUNTER — PATIENT MESSAGE (OUTPATIENT)
Dept: FAMILY MEDICINE | Facility: CLINIC | Age: 43
End: 2023-05-15
Payer: COMMERCIAL

## 2023-08-30 ENCOUNTER — OFFICE VISIT (OUTPATIENT)
Dept: FAMILY MEDICINE | Facility: CLINIC | Age: 43
End: 2023-08-30
Payer: COMMERCIAL

## 2023-08-30 VITALS
HEIGHT: 64 IN | HEART RATE: 88 BPM | BODY MASS INDEX: 26.46 KG/M2 | OXYGEN SATURATION: 98 % | DIASTOLIC BLOOD PRESSURE: 83 MMHG | SYSTOLIC BLOOD PRESSURE: 129 MMHG | WEIGHT: 155 LBS | RESPIRATION RATE: 18 BRPM

## 2023-08-30 DIAGNOSIS — E11.65 TYPE 2 DIABETES MELLITUS WITH HYPERGLYCEMIA, WITHOUT LONG-TERM CURRENT USE OF INSULIN: Primary | Chronic | ICD-10-CM

## 2023-08-30 DIAGNOSIS — F41.9 ANXIETY: Chronic | ICD-10-CM

## 2023-08-30 DIAGNOSIS — G47.09 SECONDARY INSOMNIA: Chronic | ICD-10-CM

## 2023-08-30 DIAGNOSIS — E55.9 VITAMIN D DEFICIENCY: Chronic | ICD-10-CM

## 2023-08-30 LAB
25(OH)D3 SERPL-MCNC: 44 NG/ML
ESTIMATED AVG GLUCOSE: 104 MG/DL
HBA1C MFR BLD: 5.1 % (ref 4.2–6.3)

## 2023-08-30 PROCEDURE — 3044F PR MOST RECENT HEMOGLOBIN A1C LEVEL <7.0%: ICD-10-PCS | Mod: CPTII,S$GLB,, | Performed by: NURSE PRACTITIONER

## 2023-08-30 PROCEDURE — 3079F DIAST BP 80-89 MM HG: CPT | Mod: CPTII,S$GLB,, | Performed by: NURSE PRACTITIONER

## 2023-08-30 PROCEDURE — 1159F PR MEDICATION LIST DOCUMENTED IN MEDICAL RECORD: ICD-10-PCS | Mod: CPTII,S$GLB,, | Performed by: NURSE PRACTITIONER

## 2023-08-30 PROCEDURE — 3074F SYST BP LT 130 MM HG: CPT | Mod: CPTII,S$GLB,, | Performed by: NURSE PRACTITIONER

## 2023-08-30 PROCEDURE — 3008F BODY MASS INDEX DOCD: CPT | Mod: CPTII,S$GLB,, | Performed by: NURSE PRACTITIONER

## 2023-08-30 PROCEDURE — 1159F MED LIST DOCD IN RCRD: CPT | Mod: CPTII,S$GLB,, | Performed by: NURSE PRACTITIONER

## 2023-08-30 PROCEDURE — 3074F PR MOST RECENT SYSTOLIC BLOOD PRESSURE < 130 MM HG: ICD-10-PCS | Mod: CPTII,S$GLB,, | Performed by: NURSE PRACTITIONER

## 2023-08-30 PROCEDURE — 1160F RVW MEDS BY RX/DR IN RCRD: CPT | Mod: CPTII,S$GLB,, | Performed by: NURSE PRACTITIONER

## 2023-08-30 PROCEDURE — 3008F PR BODY MASS INDEX (BMI) DOCUMENTED: ICD-10-PCS | Mod: CPTII,S$GLB,, | Performed by: NURSE PRACTITIONER

## 2023-08-30 PROCEDURE — 1160F PR REVIEW ALL MEDS BY PRESCRIBER/CLIN PHARMACIST DOCUMENTED: ICD-10-PCS | Mod: CPTII,S$GLB,, | Performed by: NURSE PRACTITIONER

## 2023-08-30 PROCEDURE — 3044F HG A1C LEVEL LT 7.0%: CPT | Mod: CPTII,S$GLB,, | Performed by: NURSE PRACTITIONER

## 2023-08-30 PROCEDURE — 3079F PR MOST RECENT DIASTOLIC BLOOD PRESSURE 80-89 MM HG: ICD-10-PCS | Mod: CPTII,S$GLB,, | Performed by: NURSE PRACTITIONER

## 2023-08-30 PROCEDURE — 99214 OFFICE O/P EST MOD 30 MIN: CPT | Mod: S$GLB,,, | Performed by: NURSE PRACTITIONER

## 2023-08-30 PROCEDURE — 99214 PR OFFICE/OUTPT VISIT, EST, LEVL IV, 30-39 MIN: ICD-10-PCS | Mod: S$GLB,,, | Performed by: NURSE PRACTITIONER

## 2023-08-30 RX ORDER — PROPRANOLOL HYDROCHLORIDE 60 MG/1
60 CAPSULE, EXTENDED RELEASE ORAL DAILY
Qty: 90 CAPSULE | Refills: 1 | Status: SHIPPED | OUTPATIENT
Start: 2023-08-30 | End: 2024-03-04

## 2023-08-30 RX ORDER — GABAPENTIN 300 MG/1
2 CAPSULE ORAL NIGHTLY
COMMUNITY
Start: 2023-07-11

## 2023-08-30 RX ORDER — TRAZODONE HYDROCHLORIDE 100 MG/1
100 TABLET ORAL NIGHTLY
Qty: 90 TABLET | Refills: 1 | Status: SHIPPED | OUTPATIENT
Start: 2023-08-30 | End: 2024-03-04 | Stop reason: SDUPTHER
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
## 2023-09-01 ENCOUNTER — TELEPHONE (OUTPATIENT)
Dept: FAMILY MEDICINE | Facility: CLINIC | Age: 43
End: 2023-09-01
Payer: COMMERCIAL

## 2023-09-01 NOTE — TELEPHONE ENCOUNTER
----- Message from Rohini Vieira NP sent at 8/31/2023  7:48 AM CDT -----  Please let her know that her vitamin D levels are adequate on the OTC Vitamin D3 supplement; Her A1c is 5.1% which is very good...

## 2024-02-07 DIAGNOSIS — E11.65 TYPE 2 DIABETES MELLITUS WITH HYPERGLYCEMIA, WITHOUT LONG-TERM CURRENT USE OF INSULIN: ICD-10-CM

## 2024-02-07 RX ORDER — METFORMIN HYDROCHLORIDE 500 MG/1
500 TABLET ORAL 2 TIMES DAILY WITH MEALS
Qty: 180 TABLET | Refills: 1 | Status: SHIPPED | OUTPATIENT
Start: 2024-02-07

## 2024-02-16 ENCOUNTER — OFFICE VISIT (OUTPATIENT)
Dept: OBSTETRICS AND GYNECOLOGY | Facility: CLINIC | Age: 44
End: 2024-02-16
Payer: COMMERCIAL

## 2024-02-16 VITALS
SYSTOLIC BLOOD PRESSURE: 133 MMHG | HEIGHT: 64 IN | HEART RATE: 88 BPM | BODY MASS INDEX: 25.44 KG/M2 | DIASTOLIC BLOOD PRESSURE: 88 MMHG | WEIGHT: 149 LBS

## 2024-02-16 DIAGNOSIS — Z01.419 ROUTINE GYNECOLOGICAL EXAMINATION: Primary | ICD-10-CM

## 2024-02-16 DIAGNOSIS — Z12.31 SCREENING MAMMOGRAM, ENCOUNTER FOR: Primary | ICD-10-CM

## 2024-02-16 PROCEDURE — 3075F SYST BP GE 130 - 139MM HG: CPT | Mod: CPTII,S$GLB,, | Performed by: OBSTETRICS & GYNECOLOGY

## 2024-02-16 PROCEDURE — 99396 PREV VISIT EST AGE 40-64: CPT | Mod: S$GLB,,, | Performed by: OBSTETRICS & GYNECOLOGY

## 2024-02-16 PROCEDURE — 3008F BODY MASS INDEX DOCD: CPT | Mod: CPTII,S$GLB,, | Performed by: OBSTETRICS & GYNECOLOGY

## 2024-02-16 PROCEDURE — 3079F DIAST BP 80-89 MM HG: CPT | Mod: CPTII,S$GLB,, | Performed by: OBSTETRICS & GYNECOLOGY

## 2024-02-16 RX ORDER — CLOTRIMAZOLE AND BETAMETHASONE DIPROPIONATE 10; .64 MG/G; MG/G
CREAM TOPICAL 2 TIMES DAILY
Qty: 45 G | Refills: 1 | Status: SHIPPED | OUTPATIENT
Start: 2024-02-16 | End: 2025-02-15

## 2024-02-16 RX ORDER — FLUCONAZOLE 150 MG/1
150 TABLET ORAL WEEKLY
Qty: 4 TABLET | Refills: 1 | Status: SHIPPED | OUTPATIENT
Start: 2024-02-16 | End: 2025-02-15

## 2024-02-16 NOTE — PROGRESS NOTES
Subjective:      Patient ID: Julianna Hancock is a 44 y.o. female.    Chief Complaint:  Well Woman      History of Present Illness  HPI  Feeling some joint pain. Patient with RA.    GYN & OB History  No LMP recorded. Patient has had an ablation.   Date of Last Pap: 8/7/2018    OB History   No obstetric history on file.       Review of Systems  Review of Systems   Constitutional:  Negative for activity change, appetite change, fatigue, fever and unexpected weight change.   HENT:  Negative for nasal congestion and tinnitus.    Eyes:  Negative for visual disturbance.   Respiratory:  Negative for cough and shortness of breath.    Cardiovascular:  Negative for chest pain and leg swelling.   Gastrointestinal:  Negative for abdominal pain, bloating, blood in stool, constipation and diarrhea.   Genitourinary:  Negative for bladder incontinence, dysuria, vaginal bleeding, vaginal discharge, vaginal pain, vaginal dryness and vaginal odor.   Musculoskeletal:  Negative for arthralgias, back pain and joint swelling.   Integumentary:  Negative for acne.   Neurological:  Negative for headaches.   Psychiatric/Behavioral:  Negative for depression and sleep disturbance. The patient is not nervous/anxious.           Objective:     Physical Exam:   Constitutional: She is oriented to person, place, and time. Vital signs are normal. She appears well-developed and well-nourished. She is cooperative.      Neck: No thyroid mass and no thyromegaly present.    Cardiovascular:  Normal rate, regular rhythm and normal pulses.             Pulmonary/Chest: Effort normal. No respiratory distress. Chest wall is not dull to percussion. She exhibits no mass, no bony tenderness, no laceration, no crepitus, no edema, no deformity, no swelling and no retraction. Right breast exhibits no inverted nipple, no mass, no nipple discharge, no skin change, no tenderness, presence, no bleeding and no swelling. Left breast exhibits no inverted nipple, no mass, no  nipple discharge, no skin change, no tenderness, presence, no bleeding and no swelling.        Abdominal: Soft. She exhibits no distension. There is no abdominal tenderness. No hernia.     Genitourinary:    Vagina, uterus and rectum normal.      Pelvic exam was performed with patient supine.     Labial bartholins normal.There is no rash, tenderness, lesion or injury on the right labia. There is no rash, tenderness, lesion or injury on the left labia. Cervix is normal. Right adnexum displays no mass, no tenderness and no fullness. Left adnexum displays no mass, no tenderness and no fullness. No vaginal discharge, rectocele, cystocele or prolapse of vaginal walls in the vagina.           Musculoskeletal: Moves all extremeties.       Neurological: She is alert and oriented to person, place, and time.    Skin: Skin is warm and dry. No rash noted.    Psychiatric: She has a normal mood and affect. Her speech is normal and behavior is normal. Judgment and thought content normal.         Assessment:     No diagnosis found.   RA  Well Woman Exam        Plan:     Routine care

## 2024-02-26 LAB — Lab: NORMAL

## 2024-03-04 ENCOUNTER — OFFICE VISIT (OUTPATIENT)
Dept: FAMILY MEDICINE | Facility: CLINIC | Age: 44
End: 2024-03-04
Payer: COMMERCIAL

## 2024-03-04 VITALS
DIASTOLIC BLOOD PRESSURE: 88 MMHG | HEIGHT: 64 IN | SYSTOLIC BLOOD PRESSURE: 131 MMHG | WEIGHT: 150 LBS | HEART RATE: 80 BPM | BODY MASS INDEX: 25.61 KG/M2 | RESPIRATION RATE: 18 BRPM | OXYGEN SATURATION: 97 %

## 2024-03-04 DIAGNOSIS — E11.65 TYPE 2 DIABETES MELLITUS WITH HYPERGLYCEMIA, WITHOUT LONG-TERM CURRENT USE OF INSULIN: Primary | Chronic | ICD-10-CM

## 2024-03-04 DIAGNOSIS — F41.9 ANXIETY: Chronic | ICD-10-CM

## 2024-03-04 DIAGNOSIS — E55.9 VITAMIN D DEFICIENCY: Chronic | ICD-10-CM

## 2024-03-04 DIAGNOSIS — M05.79 RHEUMATOID ARTHRITIS INVOLVING MULTIPLE SITES WITH POSITIVE RHEUMATOID FACTOR: Chronic | ICD-10-CM

## 2024-03-04 DIAGNOSIS — G47.09 SECONDARY INSOMNIA: Chronic | ICD-10-CM

## 2024-03-04 DIAGNOSIS — M16.0 OSTEOARTHRITIS OF BOTH HIPS, UNSPECIFIED OSTEOARTHRITIS TYPE: Chronic | ICD-10-CM

## 2024-03-04 LAB
25(OH)D3 SERPL-MCNC: 49 NG/ML
CHOLEST SERPL-MSCNC: 113 MG/DL
ESTIMATED AVG GLUCOSE: 104 MG/DL
HBA1C MFR BLD: 5.1 % (ref 4.2–6.3)
HDLC SERPL-MCNC: 40 MG/DL
LDLC SERPL CALC-MCNC: 59.4 MG/DL
T4 FREE SP9 P CHAL SERPL-SCNC: 1.17 NG/DL (ref 0.76–1.46)
TRIGL SERPL-MCNC: 68 MG/DL (ref 0–149)
TSH SERPL DL<=0.005 MIU/L-ACNC: 0.98 UIU/ML (ref 0.36–3.74)
VLDL CHOLESTEROL: 14 MG/DL

## 2024-03-04 PROCEDURE — 1159F MED LIST DOCD IN RCRD: CPT | Mod: CPTII,S$GLB,, | Performed by: NURSE PRACTITIONER

## 2024-03-04 PROCEDURE — 3008F BODY MASS INDEX DOCD: CPT | Mod: CPTII,S$GLB,, | Performed by: NURSE PRACTITIONER

## 2024-03-04 PROCEDURE — 99214 OFFICE O/P EST MOD 30 MIN: CPT | Mod: S$GLB,,, | Performed by: NURSE PRACTITIONER

## 2024-03-04 PROCEDURE — 1160F RVW MEDS BY RX/DR IN RCRD: CPT | Mod: CPTII,S$GLB,, | Performed by: NURSE PRACTITIONER

## 2024-03-04 PROCEDURE — 3079F DIAST BP 80-89 MM HG: CPT | Mod: CPTII,S$GLB,, | Performed by: NURSE PRACTITIONER

## 2024-03-04 PROCEDURE — 3075F SYST BP GE 130 - 139MM HG: CPT | Mod: CPTII,S$GLB,, | Performed by: NURSE PRACTITIONER

## 2024-03-04 RX ORDER — PROPRANOLOL HYDROCHLORIDE 80 MG/1
80 CAPSULE, EXTENDED RELEASE ORAL DAILY
Qty: 90 CAPSULE | Refills: 1 | Status: SHIPPED | OUTPATIENT
Start: 2024-03-04

## 2024-03-04 RX ORDER — PROPRANOLOL HYDROCHLORIDE 60 MG/1
60 CAPSULE, EXTENDED RELEASE ORAL DAILY
Qty: 90 CAPSULE | Refills: 1 | Status: CANCELLED | OUTPATIENT
Start: 2024-03-04

## 2024-03-04 RX ORDER — TRAZODONE HYDROCHLORIDE 100 MG/1
100 TABLET ORAL NIGHTLY
Qty: 90 TABLET | Refills: 1 | Status: SHIPPED | OUTPATIENT
Start: 2024-03-04
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
## 2024-03-05 LAB
CREATININE, URINE: 43.1 MG/DL (ref 10–175)
MICROALBUMIN URINE: 4 MG/L (ref 0–20)
MICROALBUMIN/CREATININE RATIO: 9 MG/G (ref 0–30)

## 2024-05-22 ENCOUNTER — PATIENT MESSAGE (OUTPATIENT)
Dept: FAMILY MEDICINE | Facility: CLINIC | Age: 44
End: 2024-05-22
Payer: COMMERCIAL

## 2024-05-23 ENCOUNTER — OFFICE VISIT (OUTPATIENT)
Dept: FAMILY MEDICINE | Facility: CLINIC | Age: 44
End: 2024-05-23
Payer: COMMERCIAL

## 2024-05-23 VITALS
HEIGHT: 64 IN | DIASTOLIC BLOOD PRESSURE: 78 MMHG | BODY MASS INDEX: 25.78 KG/M2 | HEART RATE: 83 BPM | TEMPERATURE: 98 F | OXYGEN SATURATION: 96 % | SYSTOLIC BLOOD PRESSURE: 128 MMHG | WEIGHT: 151 LBS

## 2024-05-23 DIAGNOSIS — Z01.818 PREOP EXAMINATION: Primary | ICD-10-CM

## 2024-05-23 DIAGNOSIS — E11.9 TYPE 2 DIABETES MELLITUS WITHOUT COMPLICATION, WITHOUT LONG-TERM CURRENT USE OF INSULIN: Chronic | ICD-10-CM

## 2024-05-23 DIAGNOSIS — S42.001K CLOSED DISPLACED FRACTURE OF RIGHT CLAVICLE WITH NONUNION, UNSPECIFIED PART OF CLAVICLE, SUBSEQUENT ENCOUNTER: ICD-10-CM

## 2024-05-23 PROBLEM — M70.70 BURSITIS OF HIP: Status: ACTIVE | Noted: 2024-05-23

## 2024-05-23 PROBLEM — B00.1 HERPES LABIALIS: Status: ACTIVE | Noted: 2024-05-23

## 2024-05-23 PROBLEM — M19.90 OSTEOARTHRITIS: Status: ACTIVE | Noted: 2023-01-23

## 2024-05-23 PROBLEM — M06.9 RHEUMATOID ARTHRITIS: Status: ACTIVE | Noted: 2019-07-25

## 2024-05-23 PROBLEM — G47.09 SECONDARY INSOMNIA: Status: ACTIVE | Noted: 2024-04-02

## 2024-05-23 PROBLEM — K44.9 HIATAL HERNIA: Status: ACTIVE | Noted: 2024-05-23

## 2024-05-23 PROBLEM — J30.1 ALLERGIC RHINITIS DUE TO POLLEN: Status: ACTIVE | Noted: 2024-05-23

## 2024-05-23 PROBLEM — E55.9 VITAMIN D DEFICIENCY: Status: ACTIVE | Noted: 2024-04-02

## 2024-05-23 PROBLEM — F41.9 ANXIETY: Status: ACTIVE | Noted: 2019-07-25

## 2024-05-23 LAB
BASOPHILS # BLD AUTO: 0 X10E3/UL (ref 0–0.2)
BASOPHILS NFR BLD AUTO: 1 %
BUN SERPL-MCNC: 12 MG/DL (ref 6–24)
BUN/CREAT SERPL: 18 (ref 9–23)
CALCIUM SERPL-MCNC: 9.5 MG/DL (ref 8.7–10.2)
CHLORIDE SERPL-SCNC: 99 MMOL/L (ref 96–106)
CO2 SERPL-SCNC: 25 MMOL/L (ref 20–29)
CREAT SERPL-MCNC: 0.68 MG/DL (ref 0.57–1)
EOSINOPHIL # BLD AUTO: 0.1 X10E3/UL (ref 0–0.4)
EOSINOPHIL NFR BLD AUTO: 1 %
ERYTHROCYTE [DISTWIDTH] IN BLOOD BY AUTOMATED COUNT: 15.6 % (ref 11.7–15.4)
EST. GFR  (NO RACE VARIABLE): 109 ML/MIN/1.73
GLUCOSE SERPL-MCNC: 90 MG/DL (ref 70–99)
HCT VFR BLD AUTO: 38.6 % (ref 34–46.6)
HGB BLD-MCNC: 12.3 G/DL (ref 11.1–15.9)
IMM GRANULOCYTES NFR BLD AUTO: 1 %
LYMPHOCYTES # BLD AUTO: 2.2 X10E3/UL (ref 0.7–3.1)
LYMPHOCYTES NFR BLD AUTO: 26 %
MCH RBC QN AUTO: 29.4 PG (ref 26.6–33)
MCHC RBC AUTO-ENTMCNC: 31.9 G/DL (ref 31.5–35.7)
MCV RBC AUTO: 92 FL (ref 79–97)
MONOCYTES # BLD AUTO: 0.5 X10E3/UL (ref 0.1–0.9)
MONOCYTES NFR BLD AUTO: 5 %
NEUTROPHILS # BLD AUTO: 5.6 X10E3/UL (ref 1.4–7)
NEUTROPHILS NFR BLD AUTO: 66 %
PLATELET # BLD AUTO: 246 X10E3/UL (ref 150–450)
POTASSIUM SERPL-SCNC: 4.3 MMOL/L (ref 3.5–5.2)
RBC # BLD AUTO: 4.19 X10E6/UL (ref 3.77–5.28)
SODIUM SERPL-SCNC: 139 MMOL/L (ref 134–144)
WBC # BLD AUTO: 8.5 X10E3/UL (ref 3.4–10.8)

## 2024-05-23 PROCEDURE — 1160F RVW MEDS BY RX/DR IN RCRD: CPT | Mod: CPTII,,, | Performed by: NURSE PRACTITIONER

## 2024-05-23 PROCEDURE — 3008F BODY MASS INDEX DOCD: CPT | Mod: CPTII,,, | Performed by: NURSE PRACTITIONER

## 2024-05-23 PROCEDURE — 93000 ELECTROCARDIOGRAM COMPLETE: CPT | Mod: ,,, | Performed by: NURSE PRACTITIONER

## 2024-05-23 PROCEDURE — 3074F SYST BP LT 130 MM HG: CPT | Mod: CPTII,,, | Performed by: NURSE PRACTITIONER

## 2024-05-23 PROCEDURE — 99214 OFFICE O/P EST MOD 30 MIN: CPT | Mod: ,,, | Performed by: NURSE PRACTITIONER

## 2024-05-23 PROCEDURE — 3066F NEPHROPATHY DOC TX: CPT | Mod: CPTII,,, | Performed by: NURSE PRACTITIONER

## 2024-05-23 PROCEDURE — 3044F HG A1C LEVEL LT 7.0%: CPT | Mod: CPTII,,, | Performed by: NURSE PRACTITIONER

## 2024-05-23 PROCEDURE — 3061F NEG MICROALBUMINURIA REV: CPT | Mod: CPTII,,, | Performed by: NURSE PRACTITIONER

## 2024-05-23 PROCEDURE — 3078F DIAST BP <80 MM HG: CPT | Mod: CPTII,,, | Performed by: NURSE PRACTITIONER

## 2024-05-23 PROCEDURE — 1159F MED LIST DOCD IN RCRD: CPT | Mod: CPTII,,, | Performed by: NURSE PRACTITIONER

## 2024-05-23 NOTE — ASSESSMENT & PLAN NOTE
Diabetes labs:   Lab Results   Component Value Date    HGBA1C 5.1 03/04/2024     Consider addition of ACE/ARB and statin at next visit

## 2024-05-23 NOTE — ASSESSMENT & PLAN NOTE
Patient is an above average risk for an intermediate risk procedure  Obtain, CBC, BMP, EKG per orthopedic recommendations

## 2024-05-23 NOTE — PROGRESS NOTES
Patient ID: Julianna Hancock  : 1980    Chief Complaint: surgery clearance and Establish Care    Allergies: Patient has No Known Allergies.     History of Present Illness:  The patient is a 44 y.o. White female who presents to clinic for follow up on surgery clearance and Establish Care.  She experienced fall about 3 days ago while she was walking her dog.  X-ray in ER revealed displaced right clavicular fracture.  She is scheduled to undergo ORIF 24 with Dr. Mixon.     DM2 diagnosed > 10 years ago. Last A1c 5.1 in March on Rybelsus and metformin. She has lost over 100 lbs. With medications and diet.     She is on propranolol for tachycardia but denies any known cardiac issues. No chest pain, palpitations, or history of issues with anesthesia.     Anxiety and depression are well-controlled with trazodone taken nightly.    RA followed by [facility] monthly for refills on pain medications.  She takes cyclobenzaprine, gabapentin, and Norco as needed. She received left hip injection by Dr. Bella and is awaiting injection into right hip.       Social History:  reports that she has quit smoking. Her smoking use included cigarettes. She has never used smokeless tobacco. She reports that she does not currently use alcohol.    Past Medical History:  has a past medical history of Anxiety, Asthma, Depression, Diabetes, and Rheumatoid arthritis, unspecified.    Current Medications:  Current Outpatient Medications   Medication Instructions    abatacept/maltose (ORENCIA, WITH MALTOSE, IV) No dose, route, or frequency recorded.    albuterol (PROVENTIL/VENTOLIN HFA) 90 mcg/actuation inhaler 2 puffs, Inhalation, Every 4 hours PRN, Rescue    calcium carbonate (OS-ASHLEE) 600 mg, Oral, Once    cetirizine (ZYRTEC) 10 MG tablet Zyrtec 10 mg tablet   Take 1 tablet every day by oral route.    cholecalciferol, vitamin D3, (VITAMIN D3) 50 mcg (2,000 unit) Cap capsule 1 capsule, Oral, Daily    clotrimazole-betamethasone 1-0.05%  "(LOTRISONE) cream Topical (Top), 2 times daily    cyclobenzaprine (FLEXERIL) 10 mg, Oral, 3 times daily    fluconazole (DIFLUCAN) 150 mg, Oral, Weekly    gabapentin (NEURONTIN) 300 MG capsule 2 capsules, Oral, Nightly    metFORMIN (GLUCOPHAGE) 500 mg, Oral, 2 times daily with meals    methotrexate 2.5 MG Tab methotrexate sodium 2.5 mg tablet    NARCAN 4 mg/actuation Spry USE WHEN NECESSARY FOR OVERDOSE. MAY REPEAT EVERY 3 MINUTES. AFTER FIRST DOSE CALL 911.    propranoloL (INDERAL LA) 80 mg, Oral, Daily    RYBELSUS 3 mg, Oral, Every morning    traZODone (DESYREL) 100 mg, Oral, Nightly       Review of Systems   Constitutional:  Negative for activity change, appetite change, fatigue and fever.   HENT:  Negative for ear pain, hearing loss and trouble swallowing.    Eyes:  Negative for pain, redness and visual disturbance.   Respiratory:  Negative for cough, chest tightness and shortness of breath.    Cardiovascular:  Negative for chest pain, palpitations, leg swelling and claudication.   Gastrointestinal:  Negative for abdominal pain, blood in stool, change in bowel habit, constipation, diarrhea, nausea and vomiting.   Endocrine: Negative for cold intolerance, heat intolerance, polydipsia, polyphagia and polyuria.   Genitourinary:  Negative for dysuria, frequency and hematuria.   Musculoskeletal:  Positive for arthralgias and joint swelling. Negative for gait problem.   Integumentary:  Negative for rash, wound and mole/lesion.   Neurological:  Negative for dizziness, seizures, weakness, headaches and memory loss.   Hematological:  Negative for adenopathy. Does not bruise/bleed easily.   Psychiatric/Behavioral:  Negative for confusion, sleep disturbance and suicidal ideas. The patient is not nervous/anxious.         Visit Vitals  /78 (BP Location: Left arm, Patient Position: Sitting, BP Method: Medium (Manual))   Pulse 83   Temp 97.5 °F (36.4 °C) (Temporal)   Ht 5' 4.02" (1.626 m)   Wt 68.5 kg (151 lb)   SpO2 96% "   BMI 25.91 kg/m²       Physical Exam  Vitals reviewed.   Constitutional:       General: She is not in acute distress.     Appearance: Normal appearance. She is not ill-appearing.   HENT:      Right Ear: Tympanic membrane, ear canal and external ear normal.      Left Ear: Tympanic membrane, ear canal and external ear normal.      Mouth/Throat:      Mouth: Mucous membranes are moist.   Eyes:      General: No scleral icterus.     Extraocular Movements: Extraocular movements intact.      Conjunctiva/sclera: Conjunctivae normal.      Pupils: Pupils are equal, round, and reactive to light.   Cardiovascular:      Rate and Rhythm: Normal rate and regular rhythm.      Pulses: Normal pulses.      Heart sounds: Normal heart sounds.   Pulmonary:      Effort: Pulmonary effort is normal.      Breath sounds: Normal breath sounds.   Abdominal:      General: Bowel sounds are normal.      Palpations: Abdomen is soft.      Tenderness: There is no abdominal tenderness.   Musculoskeletal:      Cervical back: Neck supple.      Right lower leg: No edema.      Left lower leg: No edema.      Comments: Edema and ecchymosis near right clavicle   Lymphadenopathy:      Cervical: No cervical adenopathy.   Skin:     General: Skin is warm and dry.   Neurological:      Mental Status: She is alert and oriented to person, place, and time. Mental status is at baseline.   Psychiatric:         Mood and Affect: Mood normal.         Thought Content: Thought content normal.         Judgment: Judgment normal.      EKG: Normal sinus rhythm at 81 beats per minute      Assessment & Plan:  1. Preop examination  Assessment & Plan:  Patient is an above average risk for an intermediate risk procedure  Obtain, CBC, BMP, EKG per orthopedic recommendations    Orders:  -     Cancel: CBC Auto Differential; Future; Expected date: 05/23/2024  -     Cancel: Basic Metabolic Panel; Future; Expected date: 05/23/2024  -     POCT EKG 12-LEAD (Manually Resulted by Ordering  Provider)  -     CBC Auto Differential  -     Basic Metabolic Panel    2. Type 2 diabetes mellitus without complication, without long-term current use of insulin  Overview:  On Rybelsus and metformin    Assessment & Plan:  Diabetes labs:   Lab Results   Component Value Date    HGBA1C 5.1 03/04/2024     Consider addition of ACE/ARB and statin at next visit    Orders:  -     CBC Auto Differential; Future; Expected date: 08/23/2024  -     Comprehensive Metabolic Panel; Future; Expected date: 08/23/2024  -     Lipid Panel; Future; Expected date: 08/23/2024  -     TSH; Future; Expected date: 08/23/2024  -     Hemoglobin A1C; Future; Expected date: 08/23/2024  -     CBC Auto Differential; Future; Expected date: 08/23/2024  -     Comprehensive Metabolic Panel; Future; Expected date: 08/23/2024  -     Hemoglobin A1C; Future; Expected date: 08/23/2024  -     Lipid Panel; Future; Expected date: 08/23/2024  -     TSH; Future; Expected date: 08/23/2024    3. 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  [address]       Follow up in about 4 months (around 9/23/2024) for DM, labs to be done in . Call sooner if needed.    RAUL Donald    Lab Frequency Next Occurrence   Mammo Digital Screening Bilat Once 02/16/2024

## 2024-06-13 ENCOUNTER — CLINICAL SUPPORT (OUTPATIENT)
Dept: FAMILY MEDICINE | Facility: CLINIC | Age: 44
End: 2024-06-13
Payer: COMMERCIAL

## 2024-06-13 DIAGNOSIS — E11.9 TYPE 2 DIABETES MELLITUS WITHOUT COMPLICATION, WITHOUT LONG-TERM CURRENT USE OF INSULIN: Primary | Chronic | ICD-10-CM

## 2024-06-14 LAB
LEFT EYE DM RETINOPATHY: NEGATIVE
RIGHT EYE DM RETINOPATHY: NEGATIVE

## 2024-09-16 ENCOUNTER — TELEPHONE (OUTPATIENT)
Dept: FAMILY MEDICINE | Facility: CLINIC | Age: 44
End: 2024-09-16
Payer: COMMERCIAL

## 2024-09-16 NOTE — TELEPHONE ENCOUNTER
Please call pt and see where she had labs done, and request them. we do not have them in chart for appt. On Wednesday

## 2024-09-17 ENCOUNTER — TELEPHONE (OUTPATIENT)
Dept: FAMILY MEDICINE | Facility: CLINIC | Age: 44
End: 2024-09-17
Payer: COMMERCIAL

## 2024-09-18 ENCOUNTER — OFFICE VISIT (OUTPATIENT)
Dept: FAMILY MEDICINE | Facility: CLINIC | Age: 44
End: 2024-09-18
Payer: COMMERCIAL

## 2024-09-18 VITALS
HEART RATE: 84 BPM | HEIGHT: 64 IN | TEMPERATURE: 98 F | OXYGEN SATURATION: 98 % | WEIGHT: 142 LBS | BODY MASS INDEX: 24.24 KG/M2 | DIASTOLIC BLOOD PRESSURE: 84 MMHG | SYSTOLIC BLOOD PRESSURE: 122 MMHG

## 2024-09-18 DIAGNOSIS — G47.09 SECONDARY INSOMNIA: Chronic | ICD-10-CM

## 2024-09-18 DIAGNOSIS — F41.9 ANXIETY: Chronic | ICD-10-CM

## 2024-09-18 DIAGNOSIS — E11.9 TYPE 2 DIABETES MELLITUS WITHOUT COMPLICATION, WITHOUT LONG-TERM CURRENT USE OF INSULIN: Primary | Chronic | ICD-10-CM

## 2024-09-18 PROCEDURE — 3074F SYST BP LT 130 MM HG: CPT | Mod: CPTII,,, | Performed by: NURSE PRACTITIONER

## 2024-09-18 PROCEDURE — 2023F DILAT RTA XM W/O RTNOPTHY: CPT | Mod: CPTII,,, | Performed by: NURSE PRACTITIONER

## 2024-09-18 PROCEDURE — 3044F HG A1C LEVEL LT 7.0%: CPT | Mod: CPTII,,, | Performed by: NURSE PRACTITIONER

## 2024-09-18 PROCEDURE — 1160F RVW MEDS BY RX/DR IN RCRD: CPT | Mod: CPTII,,, | Performed by: NURSE PRACTITIONER

## 2024-09-18 PROCEDURE — 3061F NEG MICROALBUMINURIA REV: CPT | Mod: CPTII,,, | Performed by: NURSE PRACTITIONER

## 2024-09-18 PROCEDURE — 99214 OFFICE O/P EST MOD 30 MIN: CPT | Mod: ,,, | Performed by: NURSE PRACTITIONER

## 2024-09-18 PROCEDURE — 1159F MED LIST DOCD IN RCRD: CPT | Mod: CPTII,,, | Performed by: NURSE PRACTITIONER

## 2024-09-18 PROCEDURE — 3066F NEPHROPATHY DOC TX: CPT | Mod: CPTII,,, | Performed by: NURSE PRACTITIONER

## 2024-09-18 PROCEDURE — 3079F DIAST BP 80-89 MM HG: CPT | Mod: CPTII,,, | Performed by: NURSE PRACTITIONER

## 2024-09-18 PROCEDURE — 3008F BODY MASS INDEX DOCD: CPT | Mod: CPTII,,, | Performed by: NURSE PRACTITIONER

## 2024-09-18 RX ORDER — HYDROCODONE BITARTRATE AND ACETAMINOPHEN 5; 325 MG/1; MG/1
1 TABLET ORAL EVERY 6 HOURS PRN
COMMUNITY
Start: 2024-06-25

## 2024-09-18 RX ORDER — METFORMIN HYDROCHLORIDE 500 MG/1
500 TABLET ORAL 2 TIMES DAILY WITH MEALS
Qty: 60 TABLET | Refills: 11 | Status: SHIPPED | OUTPATIENT
Start: 2024-09-18 | End: 2025-09-18

## 2024-09-18 RX ORDER — PROPRANOLOL HYDROCHLORIDE 80 MG/1
80 CAPSULE, EXTENDED RELEASE ORAL DAILY
Qty: 30 CAPSULE | Refills: 11 | Status: SHIPPED | OUTPATIENT
Start: 2024-09-18 | End: 2025-09-18

## 2024-09-18 RX ORDER — TRAZODONE HYDROCHLORIDE 100 MG/1
200 TABLET ORAL NIGHTLY
Qty: 60 TABLET | Refills: 11 | Status: SHIPPED | OUTPATIENT
Start: 2024-09-18 | End: 2025-09-18

## 2024-09-18 NOTE — ASSESSMENT & PLAN NOTE
Hemoglobin A1c 5.1  Continue current medications   Obtain urine microalbumin before next visit. Consider addition of ACE/ARB  Follow ADA Diet. Avoid soda, simple sweets, and limit rice/pasta/breads/starches  Maintain healthy weight with goal BMI <30.  Exercise 5 times per week for 30 minutes per day  Stressed importance of daily foot exams  Educated on importance of annual dilated eye exam

## 2024-09-18 NOTE — PROGRESS NOTES
Patient ID: Julianna Hancock  : 1980    Chief Complaint: Diabetes    Allergies: Patient has No Known Allergies.     History of Present Illness:  The patient is a 44 y.o. White female who presents to clinic for follow up on Diabetes. She is requesting to increase trazodone for insomnia. She was on 200 mg in the past. She has been taking 2 of the 100 mg tablets and is sleeping much better.    She continues physical therapy and orthopedic evaluation for right shoulder pain.  She also has follow-up in the next few weeks with Rheumatology for rheumatoid arthritis.    Reports that her last colonoscopy was in  with Dr. Mckeon and a repeat colonoscopy is needed in .    Social History:  reports that she has quit smoking. Her smoking use included cigarettes. She started smoking about 30 years ago. She has a 18 pack-year smoking history. She has never used smokeless tobacco. She reports that she does not currently use alcohol. She reports that she does not currently use drugs.    Past Medical History:  has a past medical history of Anxiety, Asthma, Depression, Diabetes, and Rheumatoid arthritis, unspecified.    Current Medications:  Current Outpatient Medications   Medication Instructions    abatacept/maltose (ORENCIA, WITH MALTOSE, IV) No dose, route, or frequency recorded.    albuterol (PROVENTIL/VENTOLIN HFA) 90 mcg/actuation inhaler 2 puffs, Inhalation, Every 4 hours PRN, Rescue    calcium carbonate (OS-ASHLEE) 600 mg, Oral, Once    cetirizine (ZYRTEC) 10 MG tablet Zyrtec 10 mg tablet   Take 1 tablet every day by oral route.    cholecalciferol, vitamin D3, (VITAMIN D3) 50 mcg (2,000 unit) Cap capsule 1 capsule, Oral, Daily    clotrimazole-betamethasone 1-0.05% (LOTRISONE) cream Topical (Top), 2 times daily    cyclobenzaprine (FLEXERIL) 10 mg, Oral, 3 times daily    fluconazole (DIFLUCAN) 150 mg, Oral, Weekly    gabapentin (NEURONTIN) 300 MG capsule 2 capsules, Oral, Nightly    HYDROcodone-acetaminophen (NORCO)  "5-325 mg per tablet 1 tablet, Oral, Every 6 hours PRN    metFORMIN (GLUCOPHAGE) 500 mg, Oral, 2 times daily with meals    methotrexate 2.5 MG Tab methotrexate sodium 2.5 mg tablet    NARCAN 4 mg/actuation Spry USE WHEN NECESSARY FOR OVERDOSE. MAY REPEAT EVERY 3 MINUTES. AFTER FIRST DOSE CALL 911.    propranoloL (INDERAL LA) 80 mg, Oral, Daily    RYBELSUS 3 mg, Oral, Every morning    traZODone (DESYREL) 200 mg, Oral, Nightly       ROS: See HPI    Visit Vitals  /84   Pulse 84   Temp 97.5 °F (36.4 °C) (Temporal)   Ht 5' 4" (1.626 m)   Wt 64.4 kg (142 lb)   LMP 09/15/2024   SpO2 98%   BMI 24.37 kg/m²       Physical Exam  Vitals reviewed.   Constitutional:       Appearance: Normal appearance.   Cardiovascular:      Rate and Rhythm: Normal rate and regular rhythm.      Heart sounds: Normal heart sounds.   Pulmonary:      Effort: Pulmonary effort is normal.      Breath sounds: Normal breath sounds.   Skin:     General: Skin is warm and dry.   Neurological:      Mental Status: She is alert and oriented to person, place, and time. Mental status is at baseline.        Protective Sensation (w/ 10 gram monofilament):  Right: Intact  Left: Intact    Visual Inspection:  Normal -  Bilateral    Pedal Pulses:   Right: Present  Left: Present    Posterior Tibialis Pulses:   Right:Present  Left: Present     Assessment & Plan:  1. Type 2 diabetes mellitus without complication, without long-term current use of insulin  Overview:  On Rybelsus and metformin    Assessment & Plan:  Hemoglobin A1c 5.1  Continue current medications   Obtain urine microalbumin before next visit. Consider addition of ACE/ARB  Follow ADA Diet. Avoid soda, simple sweets, and limit rice/pasta/breads/starches  Maintain healthy weight with goal BMI <30.  Exercise 5 times per week for 30 minutes per day  Stressed importance of daily foot exams  Educated on importance of annual dilated eye exam       Orders:  -     metFORMIN (GLUCOPHAGE) 500 MG tablet; Take 1 " tablet (500 mg total) by mouth 2 (two) times daily with meals.  Dispense: 60 tablet; Refill: 11  -     CBC Auto Differential; Future; Expected date: 03/18/2025  -     Comprehensive Metabolic Panel; Future; Expected date: 03/18/2025  -     Lipid Panel; Future; Expected date: 03/18/2025  -     TSH; Future; Expected date: 03/18/2025  -     Hemoglobin A1C; Future; Expected date: 03/18/2025  -     Microalbumin/Creatinine Ratio, Urine; Future; Expected date: 03/18/2025    2. Anxiety  Overview:  On propranolol    Assessment & Plan:  No complaints today   Continue current medication    Orders:  -     traZODone (DESYREL) 100 MG tablet; Take 2 tablets (200 mg total) by mouth every evening.  Dispense: 60 tablet; Refill: 11  -     propranoloL (INDERAL LA) 80 MG 24 hr capsule; Take 1 capsule (80 mg total) by mouth once daily.  Dispense: 30 capsule; Refill: 11    3. Secondary insomnia  Overview:  On trazodone    Assessment & Plan:  Remains symptomatic   Increase trazodone to 200 mg nightly  Sleep hygiene should include: Regular sleep schedule, optimal sleep environment (dark room, lights out, no TV or Radio), relaxing pre-sleep ritual, avoid day time naps, no caffeine after noon, no excessive alcohol intake, no tobacco before bed time, and regular daily exercise.     Orders:  -     traZODone (DESYREL) 100 MG tablet; Take 2 tablets (200 mg total) by mouth every evening.  Dispense: 60 tablet; Refill: 11         Future Appointments   Date Time Provider Department Center   3/14/2025  8:00 AM Terrie Funk FNP [facility]       Follow up in about 6 months (around 3/18/2025) for Wellness, With Fasting Labs Prior (In ). Call sooner if needed.    RAUL Donald    Lab Frequency Next Occurrence   Mammo Digital Screening Bilat Once 02/16/2024   CBC Auto Differential Once 08/23/2024   Comprehensive Metabolic Panel Once 08/23/2024   Hemoglobin A1C Once 08/23/2024   Lipid Panel Once 08/23/2024   TSH Once 08/23/2024

## 2024-09-18 NOTE — ASSESSMENT & PLAN NOTE
Remains symptomatic   Increase trazodone to 200 mg nightly  Sleep hygiene should include: Regular sleep schedule, optimal sleep environment (dark room, lights out, no TV or Radio), relaxing pre-sleep ritual, avoid day time naps, no caffeine after noon, no excessive alcohol intake, no tobacco before bed time, and regular daily exercise.

## 2024-12-26 ENCOUNTER — TELEPHONE (OUTPATIENT)
Dept: FAMILY MEDICINE | Facility: CLINIC | Age: 44
End: 2024-12-26
Payer: COMMERCIAL

## 2025-03-06 ENCOUNTER — TELEPHONE (OUTPATIENT)
Dept: FAMILY MEDICINE | Facility: CLINIC | Age: 45
End: 2025-03-06
Payer: COMMERCIAL

## 2025-03-14 ENCOUNTER — OFFICE VISIT (OUTPATIENT)
Dept: FAMILY MEDICINE | Facility: CLINIC | Age: 45
End: 2025-03-14
Payer: COMMERCIAL

## 2025-03-14 VITALS
SYSTOLIC BLOOD PRESSURE: 122 MMHG | BODY MASS INDEX: 23.84 KG/M2 | HEART RATE: 95 BPM | HEIGHT: 64 IN | WEIGHT: 139.63 LBS | OXYGEN SATURATION: 100 % | TEMPERATURE: 97 F | DIASTOLIC BLOOD PRESSURE: 86 MMHG

## 2025-03-14 DIAGNOSIS — E11.9 TYPE 2 DIABETES MELLITUS WITHOUT COMPLICATION, WITHOUT LONG-TERM CURRENT USE OF INSULIN: Chronic | ICD-10-CM

## 2025-03-14 DIAGNOSIS — G47.09 SECONDARY INSOMNIA: ICD-10-CM

## 2025-03-14 DIAGNOSIS — M06.9 RHEUMATOID ARTHRITIS, INVOLVING UNSPECIFIED SITE, UNSPECIFIED WHETHER RHEUMATOID FACTOR PRESENT: ICD-10-CM

## 2025-03-14 DIAGNOSIS — Z12.31 SCREENING MAMMOGRAM FOR BREAST CANCER: ICD-10-CM

## 2025-03-14 DIAGNOSIS — J45.909 ASTHMA, UNSPECIFIED ASTHMA SEVERITY, UNSPECIFIED WHETHER COMPLICATED, UNSPECIFIED WHETHER PERSISTENT: ICD-10-CM

## 2025-03-14 DIAGNOSIS — Z00.00 WELLNESS EXAMINATION: Primary | ICD-10-CM

## 2025-03-14 RX ORDER — TRAMADOL HYDROCHLORIDE 50 MG/1
TABLET ORAL
COMMUNITY
Start: 2025-02-14

## 2025-03-14 RX ORDER — CYANOCOBALAMIN (VITAMIN B-12) 250 MCG
TABLET ORAL
COMMUNITY

## 2025-03-14 RX ORDER — HYDROGEN PEROXIDE 3 %
SOLUTION, NON-ORAL MISCELLANEOUS
COMMUNITY

## 2025-03-14 NOTE — PROGRESS NOTES
Patient ID: Julianna Hancock  : 1980    Chief Complaint: Annual Exam    Allergies: Patient has no known allergies.     History of Present Illness:  The patient is a 45 y.o. White female who presents to clinic for follow up on Annual Exam.  She is established with Dr. Perera for Rheumatology.  She continues to experience joint pain and decreased range of motion secondary to rheumatoid arthritis. She reports recent EGD with GI for chronic gastritis.      Social History:  reports that she quit smoking about 13 years ago. Her smoking use included cigarettes. She started smoking about 31 years ago. She has a 18 pack-year smoking history. She has never used smokeless tobacco. She reports that she does not currently use alcohol. She reports that she does not currently use drugs.    Past Medical History:  has a past medical history of Amenorrhea, Anxiety, Asthma, Depression, Diabetes, Rheumatoid arthritis, unspecified, and Wellness examination.    Current Medications:  Current Outpatient Medications   Medication Instructions    abatacept/maltose (ORENCIA, WITH MALTOSE, IV) No dose, route, or frequency recorded.    albuterol (PROVENTIL/VENTOLIN HFA) 90 mcg/actuation inhaler 2 puffs, Inhalation, Every 4 hours PRN, Rescue    calcium carbonate (OS-ASHLEE) 600 mg, Once    cetirizine (ZYRTEC) 10 MG tablet Zyrtec 10 mg tablet   Take 1 tablet every day by oral route.    cholecalciferol, vitamin D3, (VITAMIN D3) 50 mcg (2,000 unit) Cap capsule 1 capsule, Daily    cyanocobalamin (VITAMIN B-12) 250 MCG tablet 1 tab(s) orally once a day    cyclobenzaprine (FLEXERIL) 10 mg, 3 times daily    esomeprazole (NEXIUM) 20 MG capsule 1 cap(s) orally once a day    gabapentin (NEURONTIN) 300 MG capsule 2 capsules, Nightly    metFORMIN (GLUCOPHAGE) 500 mg, Oral, 2 times daily with meals    methotrexate 2.5 MG Tab methotrexate sodium 2.5 mg tablet    NARCAN 4 mg/actuation Spry USE WHEN NECESSARY FOR OVERDOSE. MAY REPEAT EVERY 3 MINUTES. AFTER  "FIRST DOSE CALL 911.    propranoloL (INDERAL LA) 80 mg, Oral, Daily    RYBELSUS 3 mg, Oral, Every morning    traMADoL (ULTRAM) 50 mg tablet     traZODone (DESYREL) 200 mg, Oral, Nightly       Review of Systems   Constitutional:  Negative for activity change, appetite change, fever and night sweats.   HENT:  Negative for ear pain, hearing loss and trouble swallowing.    Eyes:  Negative for pain, redness and visual disturbance.   Respiratory:  Negative for cough, chest tightness and shortness of breath.    Cardiovascular:  Negative for chest pain, palpitations, leg swelling and claudication.   Gastrointestinal:  Negative for abdominal pain, blood in stool, change in bowel habit, nausea and vomiting.   Endocrine: Negative for cold intolerance, heat intolerance, polydipsia, polyphagia and polyuria.   Genitourinary:  Negative for dysuria, frequency and hematuria.   Musculoskeletal:  Positive for arthralgias. Negative for gait problem and joint swelling.   Integumentary:  Negative for rash, wound and mole/lesion.   Neurological:  Negative for dizziness, seizures, weakness, headaches and memory loss.   Hematological:  Negative for adenopathy. Does not bruise/bleed easily.   Psychiatric/Behavioral:  Negative for confusion, sleep disturbance and suicidal ideas. The patient is not nervous/anxious.         Visit Vitals  /86 (BP Location: Right arm, Patient Position: Sitting)   Pulse 95   Temp 97.3 °F (36.3 °C) (Temporal)   Ht 5' 4.02" (1.626 m)   Wt 63.3 kg (139 lb 9.6 oz)   SpO2 100%   BMI 23.95 kg/m²       Physical Exam  Vitals reviewed.   Constitutional:       General: She is not in acute distress.     Appearance: Normal appearance. She is not ill-appearing.   HENT:      Right Ear: Tympanic membrane, ear canal and external ear normal.      Left Ear: Tympanic membrane, ear canal and external ear normal.      Mouth/Throat:      Mouth: Mucous membranes are moist.   Eyes:      General: No scleral icterus.     Extraocular " Movements: Extraocular movements intact.      Conjunctiva/sclera: Conjunctivae normal.      Pupils: Pupils are equal, round, and reactive to light.   Cardiovascular:      Rate and Rhythm: Normal rate and regular rhythm.      Pulses: Normal pulses.      Heart sounds: Normal heart sounds.   Pulmonary:      Effort: Pulmonary effort is normal.      Breath sounds: Normal breath sounds.   Abdominal:      General: Bowel sounds are normal.      Palpations: Abdomen is soft.      Tenderness: There is no abdominal tenderness.   Musculoskeletal:      Cervical back: Normal range of motion and neck supple. No tenderness.   Lymphadenopathy:      Cervical: No cervical adenopathy.   Skin:     General: Skin is warm and dry.   Neurological:      Mental Status: She is alert and oriented to person, place, and time. Mental status is at baseline.   Psychiatric:         Mood and Affect: Mood normal.         Thought Content: Thought content normal.         Judgment: Judgment normal.              Assessment & Plan:  1. Wellness examination  Assessment & Plan:  Last mammogram 2023 and repeat ordered today   Last colonoscopy 8/20 with repeat needed in 2025  Vaccinations reviewed; will consider pneumococcal vaccine      2. Type 2 diabetes mellitus without complication, without long-term current use of insulin  Overview:  On Rybelsus and metformin    Assessment & Plan:  A1c 5.3, up from 5.1  Continue current medications.  Discussed discontinuation of metformin and patient will consider   Obtain urine microalbumin  Follow ADA Diet. Avoid soda, simple sweets, and limit rice/pasta/breads/starches  Maintain healthy weight with goal BMI <30.  Exercise 5 times per week for 30 minutes per day  Stressed importance of daily foot exams  Educated on importance of annual dilated eye exam    Orders:  -     Microalbumin/Creatinine Ratio, Urine; Future; Expected date: 03/14/2025    3. Rheumatoid arthritis, involving unspecified site, unspecified whether  rheumatoid factor present  Overview:  On Orencia, methotrexate  Followed by Dr. Perera      4. Asthma, unspecified asthma severity, unspecified whether complicated, unspecified whether persistent  Overview:  On albuterol as needed    Assessment & Plan:  Stable   Continue use of rescue inhaler as needed      5. Secondary insomnia  Overview:  On trazodone    Assessment & Plan:  Stable   Continue current medication      6. Screening mammogram for breast cancer  -     Mammo Digital Screening Bilat w/ Rikki (XPD); Future; Expected date: 03/14/2025         Future Appointments   Date Time Provider Department Center   10/17/2025  8:30 AM LAB, [facility] LABORATORY DRAW STATION [facility] [place]   10/24/2025  8:00 AM Terrie Funk FNP [facility] [place]       Follow up in about 6 months (around 9/14/2025) for DM, With Fasting Labs Prior. Call sooner if needed.    RAUL Donald    Lab Frequency Next Occurrence   Mammo Digital Screening Bilat Once 02/16/2024   CBC Auto Differential Once 08/23/2024   Comprehensive Metabolic Panel Once 08/23/2024   Hemoglobin A1C Once 08/23/2024   Lipid Panel Once 08/23/2024   TSH Once 08/23/2024   Microalbumin/Creatinine Ratio, Urine Once 03/18/2025   Microalbumin/Creatinine Ratio, Urine Once 03/14/2025

## 2025-03-14 NOTE — ASSESSMENT & PLAN NOTE
Last mammogram 2023 and repeat ordered today   Last colonoscopy 8/20 with repeat needed in 2025  Vaccinations reviewed; will consider pneumococcal vaccine

## 2025-03-14 NOTE — ASSESSMENT & PLAN NOTE
A1c 5.3, up from 5.1  Continue current medications.  Discussed discontinuation of metformin and patient will consider   Obtain urine microalbumin  Follow ADA Diet. Avoid soda, simple sweets, and limit rice/pasta/breads/starches  Maintain healthy weight with goal BMI <30.  Exercise 5 times per week for 30 minutes per day  Stressed importance of daily foot exams  Educated on importance of annual dilated eye exam

## 2025-08-07 ENCOUNTER — PATIENT MESSAGE (OUTPATIENT)
Facility: CLINIC | Age: 45
End: 2025-08-07
Payer: COMMERCIAL

## 2025-09-05 ENCOUNTER — OFFICE VISIT (OUTPATIENT)
Dept: OBSTETRICS AND GYNECOLOGY | Facility: CLINIC | Age: 45
End: 2025-09-05
Payer: COMMERCIAL

## 2025-09-05 VITALS
HEART RATE: 85 BPM | BODY MASS INDEX: 24.52 KG/M2 | HEIGHT: 64 IN | DIASTOLIC BLOOD PRESSURE: 85 MMHG | WEIGHT: 143.63 LBS | SYSTOLIC BLOOD PRESSURE: 126 MMHG

## 2025-09-05 DIAGNOSIS — Z13.820 SCREENING FOR OSTEOPOROSIS: ICD-10-CM

## 2025-09-05 DIAGNOSIS — Z01.419 WELL WOMAN EXAM WITH ROUTINE GYNECOLOGICAL EXAM: Primary | ICD-10-CM

## 2025-09-05 DIAGNOSIS — N63.21 BREAST LUMP ON LEFT SIDE AT 2 O'CLOCK POSITION: ICD-10-CM

## 2025-09-05 DIAGNOSIS — M06.9 RHEUMATOID ARTHRITIS, INVOLVING UNSPECIFIED SITE, UNSPECIFIED WHETHER RHEUMATOID FACTOR PRESENT: ICD-10-CM

## 2025-09-05 DIAGNOSIS — Z12.31 SCREENING MAMMOGRAM FOR BREAST CANCER: ICD-10-CM

## 2025-09-05 RX ORDER — FOLIC ACID 0.8 MG
800 TABLET ORAL
COMMUNITY